# Patient Record
Sex: FEMALE | Race: WHITE | NOT HISPANIC OR LATINO | URBAN - METROPOLITAN AREA
[De-identification: names, ages, dates, MRNs, and addresses within clinical notes are randomized per-mention and may not be internally consistent; named-entity substitution may affect disease eponyms.]

---

## 2017-03-09 ENCOUNTER — APPOINTMENT (RX ONLY)
Dept: URBAN - METROPOLITAN AREA CLINIC 170 | Facility: CLINIC | Age: 82
Setting detail: DERMATOLOGY
End: 2017-03-09

## 2017-03-09 DIAGNOSIS — Z85.828 PERSONAL HISTORY OF OTHER MALIGNANT NEOPLASM OF SKIN: ICD-10-CM

## 2017-03-09 DIAGNOSIS — Z71.89 OTHER SPECIFIED COUNSELING: ICD-10-CM

## 2017-03-09 DIAGNOSIS — L82.1 OTHER SEBORRHEIC KERATOSIS: ICD-10-CM

## 2017-03-09 DIAGNOSIS — D18.0 HEMANGIOMA: ICD-10-CM

## 2017-03-09 DIAGNOSIS — D22 MELANOCYTIC NEVI: ICD-10-CM

## 2017-03-09 PROBLEM — D22.5 MELANOCYTIC NEVI OF TRUNK: Status: ACTIVE | Noted: 2017-03-09

## 2017-03-09 PROBLEM — D18.01 HEMANGIOMA OF SKIN AND SUBCUTANEOUS TISSUE: Status: ACTIVE | Noted: 2017-03-09

## 2017-03-09 PROCEDURE — ? OTHER

## 2017-03-09 PROCEDURE — 99203 OFFICE O/P NEW LOW 30 MIN: CPT

## 2017-03-09 PROCEDURE — ? COUNSELING

## 2017-03-09 PROCEDURE — ? EDUCATIONAL RESOURCES PROVIDED

## 2017-03-09 ASSESSMENT — LOCATION DETAILED DESCRIPTION DERM
LOCATION DETAILED: INFERIOR THORACIC SPINE
LOCATION DETAILED: RIGHT INFERIOR LATERAL LOWER BACK
LOCATION DETAILED: INFERIOR MID FOREHEAD
LOCATION DETAILED: LEFT MEDIAL SUPERIOR CHEST

## 2017-03-09 ASSESSMENT — LOCATION SIMPLE DESCRIPTION DERM
LOCATION SIMPLE: UPPER BACK
LOCATION SIMPLE: CHEST
LOCATION SIMPLE: INFERIOR FOREHEAD
LOCATION SIMPLE: RIGHT LOWER BACK

## 2017-03-09 ASSESSMENT — LOCATION ZONE DERM
LOCATION ZONE: FACE
LOCATION ZONE: TRUNK

## 2017-03-09 NOTE — PROCEDURE: OTHER
Detail Level: Detailed
Note Text (......Xxx Chief Complaint.): This diagnosis correlates with the
Other (Free Text): Lesion on upper chest x 6 weeks\\nEnlarging

## 2017-04-20 ENCOUNTER — FOLLOW UP ESTABLISHED (OUTPATIENT)
Dept: URBAN - METROPOLITAN AREA CLINIC 24 | Facility: CLINIC | Age: 82
End: 2017-04-20
Payer: MEDICARE

## 2017-04-20 DIAGNOSIS — H35.352 CYSTOID MACULAR DEGENERATION, LEFT EYE: Primary | ICD-10-CM

## 2017-04-20 DIAGNOSIS — H35.372 PUCKERING OF MACULA, LEFT EYE: ICD-10-CM

## 2017-04-20 DIAGNOSIS — H26.492 OTHER SECONDARY CATARACT, LEFT EYE: ICD-10-CM

## 2017-04-20 PROCEDURE — 92014 COMPRE OPH EXAM EST PT 1/>: CPT | Performed by: OPHTHALMOLOGY

## 2017-04-20 PROCEDURE — 92134 CPTRZ OPH DX IMG PST SGM RTA: CPT | Performed by: OPHTHALMOLOGY

## 2017-04-20 ASSESSMENT — INTRAOCULAR PRESSURE
OS: 8
OD: 8

## 2017-06-02 ENCOUNTER — FOLLOW UP ESTABLISHED (OUTPATIENT)
Dept: URBAN - METROPOLITAN AREA CLINIC 10 | Facility: CLINIC | Age: 82
End: 2017-06-02
Payer: MEDICARE

## 2017-06-02 DIAGNOSIS — H26.493 OTHER SECONDARY CATARACT, BILATERAL: Primary | ICD-10-CM

## 2017-06-02 PROCEDURE — 92014 COMPRE OPH EXAM EST PT 1/>: CPT | Performed by: OPTOMETRIST

## 2017-06-02 PROCEDURE — 92134 CPTRZ OPH DX IMG PST SGM RTA: CPT | Performed by: OPTOMETRIST

## 2017-06-02 ASSESSMENT — VISUAL ACUITY
OD: 20/20
OS: 20/50

## 2017-06-02 ASSESSMENT — INTRAOCULAR PRESSURE
OS: 10
OD: 10

## 2017-06-05 ENCOUNTER — Encounter (OUTPATIENT)
Dept: URBAN - METROPOLITAN AREA CLINIC 10 | Facility: CLINIC | Age: 82
End: 2017-06-05
Payer: MEDICARE

## 2017-06-05 DIAGNOSIS — Z01.818 ENCOUNTER FOR OTHER PREPROCEDURAL EXAMINATION: Primary | ICD-10-CM

## 2017-06-05 PROCEDURE — 99213 OFFICE O/P EST LOW 20 MIN: CPT | Performed by: PHYSICIAN ASSISTANT

## 2017-06-05 RX ORDER — ISOSORBIDE MONONITRATE 30 MG/1
30 MG TABLET, FILM COATED, EXTENDED RELEASE ORAL
Qty: 0 | Refills: 0 | Status: ACTIVE
Start: 2017-06-05

## 2017-06-05 RX ORDER — METOPROLOL SUCCINATE 25 MG/1
25 MG TABLET, FILM COATED, EXTENDED RELEASE ORAL
Qty: 0 | Refills: 0 | Status: ACTIVE
Start: 2017-06-05

## 2017-06-20 ENCOUNTER — SURGERY (OUTPATIENT)
Dept: URBAN - METROPOLITAN AREA SURGERY 5 | Facility: SURGERY | Age: 82
End: 2017-06-20
Payer: MEDICARE

## 2017-06-20 PROCEDURE — 66821 AFTER CATARACT LASER SURGERY: CPT | Performed by: OPHTHALMOLOGY

## 2020-06-05 ENCOUNTER — APPOINTMENT (RX ONLY)
Dept: URBAN - METROPOLITAN AREA CLINIC 167 | Facility: CLINIC | Age: 85
Setting detail: DERMATOLOGY
End: 2020-06-05

## 2020-06-05 VITALS — TEMPERATURE: 98.2 F

## 2020-06-05 DIAGNOSIS — Z85.828 PERSONAL HISTORY OF OTHER MALIGNANT NEOPLASM OF SKIN: ICD-10-CM

## 2020-06-05 DIAGNOSIS — L82.1 OTHER SEBORRHEIC KERATOSIS: ICD-10-CM

## 2020-06-05 DIAGNOSIS — L57.0 ACTINIC KERATOSIS: ICD-10-CM

## 2020-06-05 PROCEDURE — ? LIQUID NITROGEN

## 2020-06-05 PROCEDURE — 99201: CPT | Mod: 25

## 2020-06-05 PROCEDURE — 17000 DESTRUCT PREMALG LESION: CPT

## 2020-06-05 ASSESSMENT — LOCATION DETAILED DESCRIPTION DERM
LOCATION DETAILED: INFERIOR THORACIC SPINE
LOCATION DETAILED: RIGHT CENTRAL ZYGOMA
LOCATION DETAILED: INFERIOR MID FOREHEAD

## 2020-06-05 ASSESSMENT — LOCATION ZONE DERM
LOCATION ZONE: FACE
LOCATION ZONE: TRUNK

## 2020-06-05 ASSESSMENT — LOCATION SIMPLE DESCRIPTION DERM
LOCATION SIMPLE: INFERIOR FOREHEAD
LOCATION SIMPLE: UPPER BACK
LOCATION SIMPLE: RIGHT ZYGOMA

## 2020-06-05 NOTE — HPI: NON-MELANOMA SKIN CANCER F/U (HISTORY OF NMSC)
How Many Skin Cancers Have You Had?: one
What Is The Reason For Today's Visit?: History of Non-Melanoma Skin Cancer
When Was Your Last Cancer Diagnosed?: 7 years

## 2021-06-28 ENCOUNTER — APPOINTMENT (RX ONLY)
Dept: URBAN - METROPOLITAN AREA CLINIC 167 | Facility: CLINIC | Age: 86
Setting detail: DERMATOLOGY
End: 2021-06-28

## 2021-06-28 DIAGNOSIS — L57.0 ACTINIC KERATOSIS: ICD-10-CM

## 2021-06-28 PROCEDURE — ? COUNSELING

## 2021-06-28 PROCEDURE — 17000 DESTRUCT PREMALG LESION: CPT

## 2021-06-28 PROCEDURE — ? LIQUID NITROGEN

## 2021-06-28 ASSESSMENT — LOCATION ZONE DERM: LOCATION ZONE: FACE

## 2021-06-28 ASSESSMENT — LOCATION DETAILED DESCRIPTION DERM: LOCATION DETAILED: LEFT INFERIOR MEDIAL MALAR CHEEK

## 2021-06-28 ASSESSMENT — LOCATION SIMPLE DESCRIPTION DERM: LOCATION SIMPLE: LEFT CHEEK

## 2022-04-07 ENCOUNTER — APPOINTMENT (OUTPATIENT)
Dept: GENERAL RADIOLOGY | Age: 87
DRG: 522 | End: 2022-04-07
Payer: MEDICARE

## 2022-04-07 ENCOUNTER — HOSPITAL ENCOUNTER (INPATIENT)
Age: 87
LOS: 7 days | Discharge: NURSING, REHAB OR LONG-TERM CARE | DRG: 522 | End: 2022-04-14
Attending: EMERGENCY MEDICINE | Admitting: INTERNAL MEDICINE
Payer: MEDICARE

## 2022-04-07 ENCOUNTER — APPOINTMENT (OUTPATIENT)
Dept: CT IMAGING | Age: 87
DRG: 522 | End: 2022-04-07
Payer: MEDICARE

## 2022-04-07 DIAGNOSIS — S72.002A CLOSED DISPLACED FRACTURE OF LEFT FEMORAL NECK (HCC): Primary | ICD-10-CM

## 2022-04-07 LAB
ANION GAP SERPL CALCULATED.3IONS-SCNC: 13 MMOL/L (ref 3–16)
BACTERIA: ABNORMAL /HPF
BASOPHILS ABSOLUTE: 0 K/UL (ref 0–0.2)
BASOPHILS RELATIVE PERCENT: 0.5 %
BILIRUBIN URINE: NEGATIVE
BLOOD, URINE: NEGATIVE
BUN BLDV-MCNC: 15 MG/DL (ref 7–20)
CALCIUM SERPL-MCNC: 9.4 MG/DL (ref 8.3–10.6)
CHLORIDE BLD-SCNC: 109 MMOL/L (ref 99–110)
CLARITY: CLEAR
CO2: 21 MMOL/L (ref 21–32)
COLOR: YELLOW
CREAT SERPL-MCNC: 0.7 MG/DL (ref 0.6–1.2)
EOSINOPHILS ABSOLUTE: 0.1 K/UL (ref 0–0.6)
EOSINOPHILS RELATIVE PERCENT: 1.5 %
EPITHELIAL CELLS, UA: ABNORMAL /HPF (ref 0–5)
GFR AFRICAN AMERICAN: >60
GFR NON-AFRICAN AMERICAN: >60
GLUCOSE BLD-MCNC: 117 MG/DL (ref 70–99)
GLUCOSE URINE: NEGATIVE MG/DL
HCT VFR BLD CALC: 40.3 % (ref 36–48)
HEMOGLOBIN: 13.6 G/DL (ref 12–16)
INR BLD: 1.04 (ref 0.88–1.12)
KETONES, URINE: 15 MG/DL
LEUKOCYTE ESTERASE, URINE: ABNORMAL
LYMPHOCYTES ABSOLUTE: 2.2 K/UL (ref 1–5.1)
LYMPHOCYTES RELATIVE PERCENT: 29.2 %
MCH RBC QN AUTO: 31.7 PG (ref 26–34)
MCHC RBC AUTO-ENTMCNC: 33.7 G/DL (ref 31–36)
MCV RBC AUTO: 94.2 FL (ref 80–100)
MICROSCOPIC EXAMINATION: YES
MONOCYTES ABSOLUTE: 0.6 K/UL (ref 0–1.3)
MONOCYTES RELATIVE PERCENT: 8.5 %
NEUTROPHILS ABSOLUTE: 4.6 K/UL (ref 1.7–7.7)
NEUTROPHILS RELATIVE PERCENT: 60.3 %
NITRITE, URINE: NEGATIVE
PDW BLD-RTO: 13.4 % (ref 12.4–15.4)
PH UA: 6 (ref 5–8)
PLATELET # BLD: 209 K/UL (ref 135–450)
PMV BLD AUTO: 8.6 FL (ref 5–10.5)
POTASSIUM REFLEX MAGNESIUM: 4.2 MMOL/L (ref 3.5–5.1)
PRO-BNP: 115 PG/ML (ref 0–449)
PROTEIN UA: ABNORMAL MG/DL
PROTHROMBIN TIME: 11.8 SEC (ref 9.9–12.7)
RBC # BLD: 4.28 M/UL (ref 4–5.2)
RBC UA: ABNORMAL /HPF (ref 0–4)
SARS-COV-2, NAAT: NOT DETECTED
SODIUM BLD-SCNC: 143 MMOL/L (ref 136–145)
SPECIFIC GRAVITY UA: >=1.03 (ref 1–1.03)
TROPONIN: <0.01 NG/ML
URINE TYPE: ABNORMAL
UROBILINOGEN, URINE: 0.2 E.U./DL
WBC # BLD: 7.6 K/UL (ref 4–11)
WBC UA: ABNORMAL /HPF (ref 0–5)

## 2022-04-07 PROCEDURE — 85025 COMPLETE CBC W/AUTO DIFF WBC: CPT

## 2022-04-07 PROCEDURE — 72170 X-RAY EXAM OF PELVIS: CPT

## 2022-04-07 PROCEDURE — 6360000002 HC RX W HCPCS: Performed by: STUDENT IN AN ORGANIZED HEALTH CARE EDUCATION/TRAINING PROGRAM

## 2022-04-07 PROCEDURE — 93005 ELECTROCARDIOGRAM TRACING: CPT | Performed by: ORTHOPAEDIC SURGERY

## 2022-04-07 PROCEDURE — 96374 THER/PROPH/DIAG INJ IV PUSH: CPT

## 2022-04-07 PROCEDURE — 6370000000 HC RX 637 (ALT 250 FOR IP): Performed by: STUDENT IN AN ORGANIZED HEALTH CARE EDUCATION/TRAINING PROGRAM

## 2022-04-07 PROCEDURE — 87086 URINE CULTURE/COLONY COUNT: CPT

## 2022-04-07 PROCEDURE — 99284 EMERGENCY DEPT VISIT MOD MDM: CPT

## 2022-04-07 PROCEDURE — 6360000002 HC RX W HCPCS: Performed by: INTERNAL MEDICINE

## 2022-04-07 PROCEDURE — 73552 X-RAY EXAM OF FEMUR 2/>: CPT

## 2022-04-07 PROCEDURE — 87635 SARS-COV-2 COVID-19 AMP PRB: CPT

## 2022-04-07 PROCEDURE — 83880 ASSAY OF NATRIURETIC PEPTIDE: CPT

## 2022-04-07 PROCEDURE — 36415 COLL VENOUS BLD VENIPUNCTURE: CPT

## 2022-04-07 PROCEDURE — 2580000003 HC RX 258: Performed by: INTERNAL MEDICINE

## 2022-04-07 PROCEDURE — 81001 URINALYSIS AUTO W/SCOPE: CPT

## 2022-04-07 PROCEDURE — 71045 X-RAY EXAM CHEST 1 VIEW: CPT

## 2022-04-07 PROCEDURE — 70450 CT HEAD/BRAIN W/O DYE: CPT

## 2022-04-07 PROCEDURE — 6370000000 HC RX 637 (ALT 250 FOR IP): Performed by: INTERNAL MEDICINE

## 2022-04-07 PROCEDURE — 84484 ASSAY OF TROPONIN QUANT: CPT

## 2022-04-07 PROCEDURE — 85610 PROTHROMBIN TIME: CPT

## 2022-04-07 PROCEDURE — 80048 BASIC METABOLIC PNL TOTAL CA: CPT

## 2022-04-07 PROCEDURE — 1200000000 HC SEMI PRIVATE

## 2022-04-07 RX ORDER — SODIUM CHLORIDE 0.9 % (FLUSH) 0.9 %
10 SYRINGE (ML) INJECTION EVERY 12 HOURS SCHEDULED
Status: DISCONTINUED | OUTPATIENT
Start: 2022-04-07 | End: 2022-04-14 | Stop reason: HOSPADM

## 2022-04-07 RX ORDER — ISOSORBIDE MONONITRATE 30 MG/1
30 TABLET, EXTENDED RELEASE ORAL DAILY
Status: DISCONTINUED | OUTPATIENT
Start: 2022-04-08 | End: 2022-04-14 | Stop reason: HOSPADM

## 2022-04-07 RX ORDER — LORATADINE 10 MG/1
10 CAPSULE, LIQUID FILLED ORAL DAILY
COMMUNITY

## 2022-04-07 RX ORDER — ACETAMINOPHEN 325 MG/1
650 TABLET ORAL EVERY 6 HOURS PRN
Status: DISCONTINUED | OUTPATIENT
Start: 2022-04-07 | End: 2022-04-08

## 2022-04-07 RX ORDER — ISOSORBIDE MONONITRATE 30 MG/1
30 TABLET, EXTENDED RELEASE ORAL DAILY
COMMUNITY

## 2022-04-07 RX ORDER — SODIUM CHLORIDE 9 MG/ML
INJECTION, SOLUTION INTRAVENOUS PRN
Status: DISCONTINUED | OUTPATIENT
Start: 2022-04-07 | End: 2022-04-14 | Stop reason: HOSPADM

## 2022-04-07 RX ORDER — MORPHINE SULFATE 4 MG/ML
4 INJECTION, SOLUTION INTRAMUSCULAR; INTRAVENOUS
Status: DISCONTINUED | OUTPATIENT
Start: 2022-04-07 | End: 2022-04-07

## 2022-04-07 RX ORDER — FENTANYL CITRATE 50 UG/ML
25 INJECTION, SOLUTION INTRAMUSCULAR; INTRAVENOUS ONCE
Status: COMPLETED | OUTPATIENT
Start: 2022-04-07 | End: 2022-04-07

## 2022-04-07 RX ORDER — METOPROLOL SUCCINATE 25 MG/1
25 TABLET, EXTENDED RELEASE ORAL DAILY
Status: DISCONTINUED | OUTPATIENT
Start: 2022-04-08 | End: 2022-04-14 | Stop reason: HOSPADM

## 2022-04-07 RX ORDER — ASPIRIN 81 MG/1
81 TABLET, CHEWABLE ORAL DAILY
COMMUNITY

## 2022-04-07 RX ORDER — SODIUM CHLORIDE 0.9 % (FLUSH) 0.9 %
10 SYRINGE (ML) INJECTION PRN
Status: DISCONTINUED | OUTPATIENT
Start: 2022-04-07 | End: 2022-04-14 | Stop reason: HOSPADM

## 2022-04-07 RX ORDER — FLUTICASONE PROPIONATE 50 MCG
1 SPRAY, SUSPENSION (ML) NASAL DAILY
COMMUNITY

## 2022-04-07 RX ORDER — METOPROLOL SUCCINATE 25 MG/1
25 TABLET, EXTENDED RELEASE ORAL DAILY
COMMUNITY

## 2022-04-07 RX ORDER — FLUTICASONE PROPIONATE 50 MCG
1 SPRAY, SUSPENSION (ML) NASAL DAILY
Status: DISCONTINUED | OUTPATIENT
Start: 2022-04-08 | End: 2022-04-14 | Stop reason: HOSPADM

## 2022-04-07 RX ORDER — CETIRIZINE HYDROCHLORIDE 10 MG/1
10 TABLET ORAL DAILY
Status: DISCONTINUED | OUTPATIENT
Start: 2022-04-08 | End: 2022-04-14 | Stop reason: HOSPADM

## 2022-04-07 RX ORDER — ONDANSETRON 2 MG/ML
4 INJECTION INTRAMUSCULAR; INTRAVENOUS EVERY 6 HOURS PRN
Status: DISCONTINUED | OUTPATIENT
Start: 2022-04-07 | End: 2022-04-14 | Stop reason: HOSPADM

## 2022-04-07 RX ORDER — MORPHINE SULFATE 2 MG/ML
2 INJECTION, SOLUTION INTRAMUSCULAR; INTRAVENOUS
Status: DISCONTINUED | OUTPATIENT
Start: 2022-04-07 | End: 2022-04-07

## 2022-04-07 RX ORDER — ATORVASTATIN CALCIUM 20 MG/1
20 TABLET, FILM COATED ORAL DAILY
Status: DISCONTINUED | OUTPATIENT
Start: 2022-04-08 | End: 2022-04-14 | Stop reason: HOSPADM

## 2022-04-07 RX ORDER — SENNA AND DOCUSATE SODIUM 50; 8.6 MG/1; MG/1
1 TABLET, FILM COATED ORAL 2 TIMES DAILY
Status: DISCONTINUED | OUTPATIENT
Start: 2022-04-07 | End: 2022-04-14 | Stop reason: HOSPADM

## 2022-04-07 RX ORDER — PROMETHAZINE HYDROCHLORIDE 25 MG/1
12.5 TABLET ORAL EVERY 6 HOURS PRN
Status: DISCONTINUED | OUTPATIENT
Start: 2022-04-07 | End: 2022-04-14 | Stop reason: HOSPADM

## 2022-04-07 RX ORDER — ACETAMINOPHEN 325 MG/1
975 TABLET ORAL ONCE
Status: COMPLETED | OUTPATIENT
Start: 2022-04-07 | End: 2022-04-07

## 2022-04-07 RX ORDER — ACETAMINOPHEN 650 MG/1
650 SUPPOSITORY RECTAL EVERY 6 HOURS PRN
Status: DISCONTINUED | OUTPATIENT
Start: 2022-04-07 | End: 2022-04-08

## 2022-04-07 RX ORDER — POLYETHYLENE GLYCOL 3350 17 G/17G
17 POWDER, FOR SOLUTION ORAL DAILY PRN
Status: DISCONTINUED | OUTPATIENT
Start: 2022-04-07 | End: 2022-04-14 | Stop reason: HOSPADM

## 2022-04-07 RX ORDER — ATORVASTATIN CALCIUM 20 MG/1
20 TABLET, FILM COATED ORAL DAILY
COMMUNITY

## 2022-04-07 RX ADMIN — SODIUM CHLORIDE, PRESERVATIVE FREE 10 ML: 5 INJECTION INTRAVENOUS at 20:40

## 2022-04-07 RX ADMIN — HYDROMORPHONE HYDROCHLORIDE 0.5 MG: 1 INJECTION, SOLUTION INTRAMUSCULAR; INTRAVENOUS; SUBCUTANEOUS at 20:34

## 2022-04-07 RX ADMIN — ACETAMINOPHEN 975 MG: 325 TABLET ORAL at 15:25

## 2022-04-07 RX ADMIN — ONDANSETRON 4 MG: 2 INJECTION INTRAMUSCULAR; INTRAVENOUS at 22:02

## 2022-04-07 RX ADMIN — MORPHINE SULFATE 4 MG: 4 INJECTION INTRAVENOUS at 17:42

## 2022-04-07 RX ADMIN — SENNOSIDES AND DOCUSATE SODIUM 1 TABLET: 50; 8.6 TABLET ORAL at 20:34

## 2022-04-07 RX ADMIN — FENTANYL CITRATE 25 MCG: 50 INJECTION, SOLUTION INTRAMUSCULAR; INTRAVENOUS at 16:53

## 2022-04-07 ASSESSMENT — PAIN DESCRIPTION - DESCRIPTORS: DESCRIPTORS: SHARP;STABBING

## 2022-04-07 ASSESSMENT — PAIN SCALES - GENERAL
PAINLEVEL_OUTOF10: 10
PAINLEVEL_OUTOF10: 8
PAINLEVEL_OUTOF10: 10

## 2022-04-07 ASSESSMENT — PAIN DESCRIPTION - FREQUENCY: FREQUENCY: CONTINUOUS

## 2022-04-07 ASSESSMENT — ENCOUNTER SYMPTOMS
FACIAL SWELLING: 0
SORE THROAT: 0
COUGH: 0
SHORTNESS OF BREATH: 0
EYE REDNESS: 0
RHINORRHEA: 0
NAUSEA: 0
EYE PAIN: 0
DIARRHEA: 0
ABDOMINAL PAIN: 0
ABDOMINAL DISTENTION: 0
PHOTOPHOBIA: 0
VOMITING: 0
CHEST TIGHTNESS: 0

## 2022-04-07 ASSESSMENT — PAIN DESCRIPTION - PAIN TYPE: TYPE: ACUTE PAIN

## 2022-04-07 ASSESSMENT — PAIN DESCRIPTION - LOCATION: LOCATION: HIP

## 2022-04-07 NOTE — PLAN OF CARE
Ortho Plan of Care    I reviewed the patient's radiographs demonstrating a left displaced femoral neck fracture  Patient is OCTOR in am for left hip hemiarthroplasty  Bedrest  NPO after midnight  Pain control  DVT prophylaxis  Medical optimization  Full consult to follow

## 2022-04-07 NOTE — H&P
Hospital Medicine History & Physical      PCP: PROVIDER Eitan Castro MD    Date of Admission: 4/7/2022    Date of Service: 4/7/2022    Pt seen/examined on 4/7/2022    Admitted to Inpatient with expected LOS greater than two midnights due to medical therapy. Chief Complaint:     Chief Complaint   Patient presents with    Hip Pain     Pt fell from standing, witnessed fall, no LOC (LEFT HIP)       History Of Present Illness:      81 y.o. female who presented to [facility] with pain in the left hip that began today when she had a mechanical fall to her left hip and has persisted since that time. There was no loss of consciousness. She has not hit her head. She has been otherwise feeling well. Found to have a left femoral neck fracture. Admitted for further management. Past Medical History:      Reviewed and non-contributory except as noted below  History reviewed. No pertinent past medical history. Past Surgical History:      Reviewed and non-contributory except as noted below  History reviewed. No pertinent surgical history. Medications Prior to Admission:      Reviewed and non-contributory except as noted below  Prior to Admission medications    Medication Sig Start Date End Date Taking?  Authorizing Provider   aspirin 81 MG chewable tablet Take 81 mg by mouth daily   Yes Historical Provider, MD   atorvastatin (LIPITOR) 20 MG tablet Take 20 mg by mouth daily   Yes Historical Provider, MD   metoprolol succinate (TOPROL XL) 25 MG extended release tablet Take 25 mg by mouth daily 1/2 tab daily   Yes Historical Provider, MD   loratadine (CLARITIN) 10 MG capsule Take 10 mg by mouth daily   Yes Historical Provider, MD   fluticasone (FLONASE) 50 MCG/ACT nasal spray 1 spray by Each Nostril route daily   Yes Historical Provider, MD   isosorbide mononitrate (IMDUR) 30 MG extended release tablet Take 30 mg by mouth daily   Yes Historical Provider, MD       Allergies:     Reviewed and non-contributory except as noted below   Patient has no known allergies. Social History:      Reviewed and non-contributory except as noted below    TOBACCO:   reports that she has never smoked. She has never used smokeless tobacco.  ETOH:   reports no history of alcohol use. Family History:      Reviewed and non-contributory except as noted below    History reviewed. No pertinent family history. REVIEW OF SYSTEMS:   Pertinent positives and negatives as noted in the HPI. All other systems reviewed and negative. PHYSICAL EXAM PERFORMED:    /64   Pulse 67   Temp 97.7 °F (36.5 °C) (Oral)   Resp 16   Ht 5' 2\" (1.575 m)   Wt 128 lb 12.8 oz (58.4 kg)   SpO2 97%   BMI 23.56 kg/m²     General appearance:  No acute distress, appears stated age  Eyes: Pupils equal, round, reactive to light, conjunctiva/corneas clear  Ears/Nose/Mouth/Throat: No external lesions or scars, hearing intact to voice  Neck: Trachea midline, no masses noted, no thyromegaly  Respiratory:  Non-labored breathing, clear to auscultation bilaterally  Cardiovascular: Regular rate and rhythm, no murmurs, gallops, or rubs  Abdomen: soft, non-tender, non-distended  Musculoskeletal: Left lower extremity neurovascularly intact  Skin: normal color, no wounds noted  Psychiatric: A&Ox4, good insight and judgment    Labs:     Recent Labs     04/07/22  1535   WBC 7.6   HGB 13.6   HCT 40.3        Recent Labs     04/07/22  1535      K 4.2      CO2 21   BUN 15   CREATININE 0.7   CALCIUM 9.4     No results for input(s): AST, ALT, BILIDIR, BILITOT, ALKPHOS in the last 72 hours.   Recent Labs     04/07/22  1534   INR 1.04     Recent Labs     04/07/22  1535   TROPONINI <0.01       Urinalysis:      Lab Results   Component Value Date    NITRU Negative 04/07/2022    WBCUA 0-2 04/07/2022    BACTERIA 2+ 04/07/2022    RBCUA None seen 04/07/2022    BLOODU Negative 04/07/2022    SPECGRAV >=1.030 04/07/2022    GLUCOSEU Negative 04/07/2022       Radiology: XR CHEST PORTABLE   Final Result   1. No evidence of acute cardiopulmonary disease. XR PELVIS (1-2 VIEWS)   Final Result      Acute left femoral neck fracture. LEFT FEMUR 2 VIEWS      INDICATION: Fall. Fracture. FINDINGS: AP and lateral views left femur were obtained. Note is again made of the left femoral neck fracture. No other fractures are identified. No dislocation is identified. IMPRESSION:   1. Acute left femoral neck fracture. XR FEMUR LEFT (MIN 2 VIEWS)   Final Result      Acute left femoral neck fracture. LEFT FEMUR 2 VIEWS      INDICATION: Fall. Fracture. FINDINGS: AP and lateral views left femur were obtained. Note is again made of the left femoral neck fracture. No other fractures are identified. No dislocation is identified. IMPRESSION:   1. Acute left femoral neck fracture. CT Head WO Contrast   Final Result   1. Significant central and cortical atrophic changes   2. 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 No evidence of acute intracranial hemorrhage          ASSESSMENT:    Active Hospital Problems    Diagnosis Date Noted    Closed displaced fracture of left femoral neck ([facility] [address] [S72.002A] 04/07/2022       PLAN:    #Traumatic left femoral neck fracture  Further work-up required  Orthopedic surgery consulted  Analgesia as needed including IV morphine  N.p.o. at midnight for possible intervention  We will need PT/OT, CM postop  Hold AP/AC possible procedure    #Full medication list  Hold ASA for possible surgery  Continue remainder of home medications    DVT Prophylaxis: SCDs  Diet: ADULT DIET; Regular  Diet NPO Exceptions are: Sips of Water with Meds  Code Status: Full Code    PT/OT Eval Status: Deferred    Dispo: Lucie Khan pending clinical improvement    Milton Douglass MD    Thank you PROVIDER UNKNOWN, MD for the opportunity to be involved in this patient's care.  If you have any questions or concerns please feel free to contact me at [phone].

## 2022-04-07 NOTE — ED PROVIDER NOTES
[address] [facility] RESIDENT NOTE       Date of evaluation: 4/7/2022    Chief Complaint     Hip Pain (Pt fell from standing, witnessed fall, no LOC (LEFT HIP))      History of Present Illness     Ivan Jason is a 81 y.o. female with a PMH of dementia, CAD, who presents following a fall. According to EMS, patient sustained a witnessed fall and presents following a mechanical fall. Apparently did not hit her head or lose consciousness. Patient is not anticoagulated. Upon arrival to our emergency department, patient complains of left-sided hip pain. Denies any numbness or tingling of her leg. Does not recall the circumstances of the fall. Denies any chest pain or shortness of breath. Discussed with patient's , who confirms that patient fell while standing with her walker. Did not lose consciousness or hit her head. Appears to have directly landed onto her left hip. Has been complaining of left hip pain since the fall. Review of Systems     Review of Systems   Constitutional: Negative for activity change, appetite change, chills and fever. HENT: Negative for congestion, ear pain, facial swelling, rhinorrhea and sore throat. Eyes: Negative for photophobia, pain and redness. Respiratory: Negative for cough, chest tightness and shortness of breath. Cardiovascular: Negative for chest pain and leg swelling. Gastrointestinal: Negative for abdominal distention, abdominal pain, diarrhea, nausea and vomiting. Endocrine: Negative for polydipsia, polyphagia and polyuria. Genitourinary: Negative for difficulty urinating, dysuria and flank pain. Musculoskeletal: Positive for joint swelling. Negative for myalgias, neck pain and neck stiffness. Skin: Negative for pallor, rash and wound. Allergic/Immunologic: Negative for food allergies and immunocompromised state. Neurological: Negative for seizures, syncope and headaches.        Past Medical, Surgical, Family, and Social History     She has no past medical history on file. She has no past surgical history on file. Her family history is not on file. She reports that she has never smoked. She has never used smokeless tobacco. She reports that she does not drink alcohol and does not use drugs. Medications     Current Discharge Medication List      CONTINUE these medications which have NOT CHANGED    Details   aspirin 81 MG chewable tablet Take 81 mg by mouth daily      atorvastatin (LIPITOR) 20 MG tablet Take 20 mg by mouth daily      metoprolol succinate (TOPROL XL) 25 MG extended release tablet Take 25 mg by mouth daily 1/2 tab daily      loratadine (CLARITIN) 10 MG capsule Take 10 mg by mouth daily      fluticasone (FLONASE) 50 MCG/ACT nasal spray 1 spray by Each Nostril route daily      isosorbide mononitrate (IMDUR) 30 MG extended release tablet Take 30 mg by mouth daily             Allergies     She has No Known Allergies. Physical Exam     INITIAL VITALS: BP: (!) 162/97, Temp: 97.7 °F (36.5 °C), Pulse: 88, Resp: 16, SpO2: 100 %   Physical Exam  HENT:      Head: Normocephalic. Right Ear: Tympanic membrane normal.      Left Ear: Tympanic membrane normal.      Nose: Nose normal.      Mouth/Throat:      Pharynx: Oropharynx is clear. Eyes:      Extraocular Movements: Extraocular movements intact. Pupils: Pupils are equal, round, and reactive to light. Cardiovascular:      Rate and Rhythm: Normal rate and regular rhythm. Pulses: Normal pulses. Pulmonary:      Effort: Pulmonary effort is normal. No respiratory distress. Breath sounds: No wheezing. Abdominal:      General: There is no distension. Palpations: Abdomen is soft. Tenderness: There is no abdominal tenderness. Musculoskeletal:         General: Tenderness and signs of injury present. Cervical back: Normal range of motion. Comments: Tenderness to palpation over left hip with logroll. Left hip held in externally rotated position and knee is flexed. Tenderness to palpation over knee and or shin, yet there is tenderness over the mid femur. No obvious issues with angulation. No overlying hematoma. Palpable still dorsalis pedis pulse. Sensation over distal foot. Skin:     General: Skin is warm. Capillary Refill: Capillary refill takes less than 2 seconds. Neurological:      General: No focal deficit present. Mental Status: She is alert. Mental status is at baseline. Comments: Oriented x2, which appears to be patient's baseline, not orientated to time. DiagnosticResults     EKG   Interpreted in conjunction with emergencydepartment physician Sergey Zambrano MD  Rhythm: normal sinus   Rate: normal  Axis: normal  Ectopy: premature ventricular contractions (unifocal)  Conduction: non=specific block  ST Segments: no acute change  T Waves:no acute change  Q Waves: nonspecific  Clinical Impression: overall, poor quality EKG yet no ischemic changes    RADIOLOGY:  XR CHEST PORTABLE   Final Result   1. No evidence of acute cardiopulmonary disease. XR PELVIS (1-2 VIEWS)   Final Result      Acute left femoral neck fracture. LEFT FEMUR 2 VIEWS      INDICATION: Fall. Fracture. FINDINGS: AP and lateral views left femur were obtained. Note is again made of the left femoral neck fracture. No other fractures are identified. No dislocation is identified. IMPRESSION:   1. Acute left femoral neck fracture. XR FEMUR LEFT (MIN 2 VIEWS)   Final Result      Acute left femoral neck fracture. LEFT FEMUR 2 VIEWS      INDICATION: Fall. Fracture. FINDINGS: AP and lateral views left femur were obtained. Note is again made of the left femoral neck fracture. No other fractures are identified. No dislocation is identified. IMPRESSION:   1. Acute left femoral neck fracture. CT Head WO Contrast   Final Result   1.  Significant central and cortical atrophic changes   2. 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  No evidence of acute intracranial hemorrhage          LABS:   Results for orders placed or performed during the hospital encounter of 04/07/22   COVID-19, Rapid    Specimen: Nasopharyngeal Swab   Result Value Ref Range    SARS-CoV-2, NAAT Not Detected Not Detected   CBC with Auto Differential   Result Value Ref Range    WBC 7.6 4.0 - 11.0 K/uL    RBC 4.28 4.00 - 5.20 M/uL    Hemoglobin 13.6 12.0 - 16.0 g/dL    Hematocrit 40.3 36.0 - 48.0 %    MCV 94.2 80.0 - 100.0 fL    MCH 31.7 26.0 - 34.0 pg    MCHC 33.7 31.0 - 36.0 g/dL    RDW 13.4 12.4 - 15.4 %    Platelets 807 476 - 312 K/uL    MPV 8.6 5.0 - 10.5 fL    Neutrophils % 60.3 %    Lymphocytes % 29.2 %    Monocytes % 8.5 %    Eosinophils % 1.5 %    Basophils % 0.5 %    Neutrophils Absolute 4.6 1.7 - 7.7 K/uL    Lymphocytes Absolute 2.2 1.0 - 5.1 K/uL    Monocytes Absolute 0.6 0.0 - 1.3 K/uL    Eosinophils Absolute 0.1 0.0 - 0.6 K/uL    Basophils Absolute 0.0 0.0 - 0.2 K/uL   Basic Metabolic Panel w/ Reflex to MG   Result Value Ref Range    Sodium 143 136 - 145 mmol/L    Potassium reflex Magnesium 4.2 3.5 - 5.1 mmol/L    Chloride 109 99 - 110 mmol/L    CO2 21 21 - 32 mmol/L    Anion Gap 13 3 - 16    Glucose 117 (H) 70 - 99 mg/dL    BUN 15 7 - 20 mg/dL    CREATININE 0.7 0.6 - 1.2 mg/dL    GFR Non-African American >60 >60    GFR African American >60 >60    Calcium 9.4 8.3 - 10.6 mg/dL   Troponin   Result Value Ref Range    Troponin <0.01 <0.01 ng/mL   Brain Natriuretic Peptide   Result Value Ref Range    Pro- 0 - 449 pg/mL   Urinalysis with Microscopic   Result Value Ref Range    Color, UA Yellow Straw/Yellow    Clarity, UA Clear Clear    Glucose, Ur Negative Negative mg/dL    Bilirubin Urine Negative Negative    Ketones, Urine 15 (A) Negative mg/dL    Specific Gravity, UA >=1.030 1.005 - 1.030    Blood, Urine Negative Negative    pH, UA 6.0 5.0 - 8.0    Protein, UA TRACE (A) Negative mg/dL    Urobilinogen, Urine 0.2 <2.0 E.U./dL    Nitrite, Urine Negative Negative    Leukocyte Esterase, Urine TRACE (A) Negative    Microscopic Examination YES     Urine Type Catheter     WBC, UA 0-2 0 - 5 /HPF    RBC, UA None seen 0 - 4 /HPF    Epithelial Cells, UA 11-20 (A) 0 - 5 /HPF    Bacteria, UA 2+ (A) None Seen /HPF   Protime-INR   Result Value Ref Range    Protime 11.8 9.9 - 12.7 sec    INR 1.04 0.88 - 1.12       ED BEDSIDE ULTRASOUND:  None    RECENT VITALS:  BP: (!) 155/78, Temp: 97.7 °F (36.5 °C), Pulse: 74,Resp: 17, SpO2: 94 %     Procedures     None     ED Course     Nursing Notes, Past Medical Hx, Past Surgical Hx, Social Hx, Allergies, and Family Hx were reviewed.     The patient was given the followingmedications:  Orders Placed This Encounter   Medications    acetaminophen (TYLENOL) tablet 975 mg    fentaNYL (SUBLIMAZE) injection 25 mcg    atorvastatin (LIPITOR) tablet 20 mg    fluticasone (FLONASE) 50 MCG/ACT nasal spray 1 spray    isosorbide mononitrate (IMDUR) extended release tablet 30 mg    metoprolol succinate (TOPROL XL) extended release tablet 25 mg    cetirizine (ZYRTEC) tablet 10 mg    DISCONTD: morphine (PF) injection 2 mg    DISCONTD: morphine injection 4 mg    sodium chloride flush 0.9 % injection 10 mL    sodium chloride flush 0.9 % injection 10 mL    0.9 % sodium chloride infusion    OR Linked Order Group     promethazine (PHENERGAN) tablet 12.5 mg     ondansetron (ZOFRAN) injection 4 mg    sennosides-docusate sodium (SENOKOT-S) 8.6-50 MG tablet 1 tablet    polyethylene glycol (GLYCOLAX) packet 17 g    OR Linked Order Group     acetaminophen (TYLENOL) tablet 650 mg     acetaminophen (TYLENOL) suppository 650 mg    DISCONTD: HYDROmorphone (DILAUDID) injection 0.25 mg    DISCONTD: HYDROmorphone (DILAUDID) injection 0.5 mg    OR Linked Order Group     HYDROmorphone (DILAUDID) injection 0.25 mg     HYDROmorphone (DILAUDID) injection 0.5 mg       CONSULTS:  [address] TO HOSPITALIST  IP CONSULT TO CASE MANAGEMENT    MEDICAL DECISION MAKING / ASSESSMENT / PLAN   Upon presentation, patient was found to be alert, comfortable, with unremarkable vitals signs. Given that patient presents to the ED following a ground-level fall onto her left hip, and underlying factor was considered the most likely cause of patient's pain. This was further supported by her holding her hip in a externally rotated position pain worsened with logroll. Otherwise, primary survey was intact, no other injuries found on secondary survey with a GCS of 15. Overall, patient appears to have suffered a mechanical fall. Syncopal episode was considered, edema less likely, as patient's  denies any loss of consciousness. Furthermore, patient does not complain of any chest pain, shortness of breath, and or other significant symptoms on presentation to the ED. CBC without leukocytosis. Hemoglobin hematocrit appear stable. Metabolic panel without significant electrolyte abnormalities. Troponin was undetectable. EKG without ischemic changes. X-ray of the left hip with a left femoral neck fracture. Femur x-rays were unremarkable. X-ray without underlying rib fractures. CT head without signs of an active bleed. Patient was given Tylenol and fentanyl for pain control. Case was discussed with orthopedic surgery, that recommended likely surgical intervention in the next 24 to 48 hours. In the meantime decision was made to admit patient to the medicine service for pain control and monitoring. This patient was also evaluated by the attending physician. All care plans werediscussed and agreed upon. Clinical Impression     1. Closed displaced fracture of left femoral neck (HCC)        Disposition     PATIENT REFERRED TO:  No follow-up provider specified.     DISCHARGE MEDICATIONS:  Current Discharge Medication List

## 2022-04-07 NOTE — ED PROVIDER NOTES
ED Attending Attestation Note     Date of evaluation: 4/7/2022    This patient was seen by the resident. I have seen and examined the patient, agree with the workup, evaluation, management and diagnosis. The care plan has been discussed. Patient presents to the emerge department after a witnessed mechanical fall earlier today during which she landed on her left hip. She is not on any anticoagulation. She denies strike her head or lose consciousness. She reports severe, 8/10 severity pain in the left hip. On examination find a pleasant adult female, speaking in complete sentences. No increased work of breathing or accessory muscle use during respiration. The patient has a internally rotated and foreshortened left hip concerning for femoral fracture. She is neurovascular intact to the extremity including strong DP and PT pulses. We will plan for x-ray imaging and likely admission versus transfer based on orthopedic recommendations. Ray Bishop MD MPH   Physician       Ana Cardenas MD  04/07/22 1998

## 2022-04-08 ENCOUNTER — ANESTHESIA (OUTPATIENT)
Dept: OPERATING ROOM | Age: 87
DRG: 522 | End: 2022-04-08
Payer: MEDICARE

## 2022-04-08 ENCOUNTER — ANESTHESIA EVENT (OUTPATIENT)
Dept: OPERATING ROOM | Age: 87
DRG: 522 | End: 2022-04-08
Payer: MEDICARE

## 2022-04-08 ENCOUNTER — APPOINTMENT (OUTPATIENT)
Dept: GENERAL RADIOLOGY | Age: 87
DRG: 522 | End: 2022-04-08
Payer: MEDICARE

## 2022-04-08 VITALS — OXYGEN SATURATION: 100 % | TEMPERATURE: 97.2 F | SYSTOLIC BLOOD PRESSURE: 199 MMHG | DIASTOLIC BLOOD PRESSURE: 83 MMHG

## 2022-04-08 LAB
ANION GAP SERPL CALCULATED.3IONS-SCNC: 12 MMOL/L (ref 3–16)
BASOPHILS ABSOLUTE: 0 K/UL (ref 0–0.2)
BASOPHILS RELATIVE PERCENT: 0.2 %
BUN BLDV-MCNC: 20 MG/DL (ref 7–20)
CALCIUM SERPL-MCNC: 9.5 MG/DL (ref 8.3–10.6)
CHLORIDE BLD-SCNC: 107 MMOL/L (ref 99–110)
CO2: 20 MMOL/L (ref 21–32)
CREAT SERPL-MCNC: 0.6 MG/DL (ref 0.6–1.2)
EOSINOPHILS ABSOLUTE: 0.1 K/UL (ref 0–0.6)
EOSINOPHILS RELATIVE PERCENT: 0.5 %
GFR AFRICAN AMERICAN: >60
GFR NON-AFRICAN AMERICAN: >60
GLUCOSE BLD-MCNC: 121 MG/DL (ref 70–99)
HCT VFR BLD CALC: 38.8 % (ref 36–48)
HEMOGLOBIN: 13.2 G/DL (ref 12–16)
LYMPHOCYTES ABSOLUTE: 1.5 K/UL (ref 1–5.1)
LYMPHOCYTES RELATIVE PERCENT: 14.3 %
MCH RBC QN AUTO: 32.2 PG (ref 26–34)
MCHC RBC AUTO-ENTMCNC: 33.9 G/DL (ref 31–36)
MCV RBC AUTO: 94.8 FL (ref 80–100)
MONOCYTES ABSOLUTE: 1.1 K/UL (ref 0–1.3)
MONOCYTES RELATIVE PERCENT: 10 %
NEUTROPHILS ABSOLUTE: 8 K/UL (ref 1.7–7.7)
NEUTROPHILS RELATIVE PERCENT: 75 %
PDW BLD-RTO: 13.1 % (ref 12.4–15.4)
PLATELET # BLD: 201 K/UL (ref 135–450)
PMV BLD AUTO: 8.7 FL (ref 5–10.5)
POTASSIUM REFLEX MAGNESIUM: 4.4 MMOL/L (ref 3.5–5.1)
RBC # BLD: 4.09 M/UL (ref 4–5.2)
SODIUM BLD-SCNC: 139 MMOL/L (ref 136–145)
URINE CULTURE, ROUTINE: NORMAL
WBC # BLD: 10.6 K/UL (ref 4–11)

## 2022-04-08 PROCEDURE — 2500000003 HC RX 250 WO HCPCS: Performed by: NURSE ANESTHETIST, CERTIFIED REGISTERED

## 2022-04-08 PROCEDURE — 73502 X-RAY EXAM HIP UNI 2-3 VIEWS: CPT

## 2022-04-08 PROCEDURE — 6360000002 HC RX W HCPCS: Performed by: INTERNAL MEDICINE

## 2022-04-08 PROCEDURE — 6360000002 HC RX W HCPCS: Performed by: ORTHOPAEDIC SURGERY

## 2022-04-08 PROCEDURE — 2580000003 HC RX 258: Performed by: ORTHOPAEDIC SURGERY

## 2022-04-08 PROCEDURE — 7100000000 HC PACU RECOVERY - FIRST 15 MIN: Performed by: ORTHOPAEDIC SURGERY

## 2022-04-08 PROCEDURE — 2580000003 HC RX 258: Performed by: INTERNAL MEDICINE

## 2022-04-08 PROCEDURE — 1200000000 HC SEMI PRIVATE

## 2022-04-08 PROCEDURE — 36415 COLL VENOUS BLD VENIPUNCTURE: CPT

## 2022-04-08 PROCEDURE — 6360000002 HC RX W HCPCS: Performed by: ANESTHESIOLOGY

## 2022-04-08 PROCEDURE — 3600000015 HC SURGERY LEVEL 5 ADDTL 15MIN: Performed by: ORTHOPAEDIC SURGERY

## 2022-04-08 PROCEDURE — 3700000000 HC ANESTHESIA ATTENDED CARE: Performed by: ORTHOPAEDIC SURGERY

## 2022-04-08 PROCEDURE — 6370000000 HC RX 637 (ALT 250 FOR IP): Performed by: ORTHOPAEDIC SURGERY

## 2022-04-08 PROCEDURE — 0SRS0JZ REPLACEMENT OF LEFT HIP JOINT, FEMORAL SURFACE WITH SYNTHETIC SUBSTITUTE, OPEN APPROACH: ICD-10-PCS | Performed by: ORTHOPAEDIC SURGERY

## 2022-04-08 PROCEDURE — 3600000005 HC SURGERY LEVEL 5 BASE: Performed by: ORTHOPAEDIC SURGERY

## 2022-04-08 PROCEDURE — 7100000001 HC PACU RECOVERY - ADDTL 15 MIN: Performed by: ORTHOPAEDIC SURGERY

## 2022-04-08 PROCEDURE — 3700000001 HC ADD 15 MINUTES (ANESTHESIA): Performed by: ORTHOPAEDIC SURGERY

## 2022-04-08 PROCEDURE — 85025 COMPLETE CBC W/AUTO DIFF WBC: CPT

## 2022-04-08 PROCEDURE — 2500000003 HC RX 250 WO HCPCS: Performed by: ORTHOPAEDIC SURGERY

## 2022-04-08 PROCEDURE — 2709999900 HC NON-CHARGEABLE SUPPLY: Performed by: ORTHOPAEDIC SURGERY

## 2022-04-08 PROCEDURE — 80048 BASIC METABOLIC PNL TOTAL CA: CPT

## 2022-04-08 PROCEDURE — C1776 JOINT DEVICE (IMPLANTABLE): HCPCS | Performed by: ORTHOPAEDIC SURGERY

## 2022-04-08 PROCEDURE — 99222 1ST HOSP IP/OBS MODERATE 55: CPT | Performed by: ORTHOPAEDIC SURGERY

## 2022-04-08 PROCEDURE — 6360000002 HC RX W HCPCS: Performed by: NURSE ANESTHETIST, CERTIFIED REGISTERED

## 2022-04-08 PROCEDURE — 94640 AIRWAY INHALATION TREATMENT: CPT

## 2022-04-08 PROCEDURE — 2580000003 HC RX 258: Performed by: ANESTHESIOLOGY

## 2022-04-08 PROCEDURE — 6370000000 HC RX 637 (ALT 250 FOR IP): Performed by: INTERNAL MEDICINE

## 2022-04-08 DEVICE — AVENIR COMPLETE STANDARD COLLARED SIZE 3: Type: IMPLANTABLE DEVICE | Site: HIP | Status: FUNCTIONAL

## 2022-04-08 DEVICE — HIP H4 HEMI UNIV BIPLR IMPL CAPPED H4: Type: IMPLANTABLE DEVICE | Site: HIP | Status: FUNCTIONAL

## 2022-04-08 DEVICE — IMPLANTABLE DEVICE: Type: IMPLANTABLE DEVICE | Site: HIP | Status: FUNCTIONAL

## 2022-04-08 RX ORDER — SODIUM CHLORIDE 9 MG/ML
25 INJECTION, SOLUTION INTRAVENOUS PRN
Status: DISCONTINUED | OUTPATIENT
Start: 2022-04-08 | End: 2022-04-08 | Stop reason: HOSPADM

## 2022-04-08 RX ORDER — OXYCODONE HYDROCHLORIDE 5 MG/1
5 TABLET ORAL EVERY 6 HOURS PRN
Status: DISCONTINUED | OUTPATIENT
Start: 2022-04-08 | End: 2022-04-14 | Stop reason: HOSPADM

## 2022-04-08 RX ORDER — SODIUM CHLORIDE 0.9 % (FLUSH) 0.9 %
5-40 SYRINGE (ML) INJECTION PRN
Status: DISCONTINUED | OUTPATIENT
Start: 2022-04-08 | End: 2022-04-08 | Stop reason: HOSPADM

## 2022-04-08 RX ORDER — DEXAMETHASONE SODIUM PHOSPHATE 4 MG/ML
INJECTION, SOLUTION INTRA-ARTICULAR; INTRALESIONAL; INTRAMUSCULAR; INTRAVENOUS; SOFT TISSUE PRN
Status: DISCONTINUED | OUTPATIENT
Start: 2022-04-08 | End: 2022-04-08 | Stop reason: SDUPTHER

## 2022-04-08 RX ORDER — FENTANYL CITRATE 50 UG/ML
25 INJECTION, SOLUTION INTRAMUSCULAR; INTRAVENOUS EVERY 5 MIN PRN
Status: DISCONTINUED | OUTPATIENT
Start: 2022-04-08 | End: 2022-04-08

## 2022-04-08 RX ORDER — BUPIVACAINE HYDROCHLORIDE 5 MG/ML
INJECTION, SOLUTION EPIDURAL; INTRACAUDAL PRN
Status: DISCONTINUED | OUTPATIENT
Start: 2022-04-08 | End: 2022-04-08 | Stop reason: ALTCHOICE

## 2022-04-08 RX ORDER — TRAMADOL HYDROCHLORIDE 50 MG/1
50 TABLET ORAL EVERY 4 HOURS PRN
Status: DISCONTINUED | OUTPATIENT
Start: 2022-04-08 | End: 2022-04-14 | Stop reason: HOSPADM

## 2022-04-08 RX ORDER — ONDANSETRON 2 MG/ML
4 INJECTION INTRAMUSCULAR; INTRAVENOUS
Status: DISCONTINUED | OUTPATIENT
Start: 2022-04-08 | End: 2022-04-08 | Stop reason: HOSPADM

## 2022-04-08 RX ORDER — OXYCODONE HYDROCHLORIDE 5 MG/1
10 TABLET ORAL EVERY 6 HOURS PRN
Status: DISCONTINUED | OUTPATIENT
Start: 2022-04-08 | End: 2022-04-14 | Stop reason: HOSPADM

## 2022-04-08 RX ORDER — SODIUM CHLORIDE, SODIUM LACTATE, POTASSIUM CHLORIDE, CALCIUM CHLORIDE 600; 310; 30; 20 MG/100ML; MG/100ML; MG/100ML; MG/100ML
INJECTION, SOLUTION INTRAVENOUS CONTINUOUS
Status: DISCONTINUED | OUTPATIENT
Start: 2022-04-08 | End: 2022-04-12

## 2022-04-08 RX ORDER — MEPERIDINE HYDROCHLORIDE 25 MG/ML
12.5 INJECTION INTRAMUSCULAR; INTRAVENOUS; SUBCUTANEOUS EVERY 5 MIN PRN
Status: DISCONTINUED | OUTPATIENT
Start: 2022-04-08 | End: 2022-04-08 | Stop reason: HOSPADM

## 2022-04-08 RX ORDER — GLYCOPYRROLATE 1 MG/5 ML
SYRINGE (ML) INTRAVENOUS PRN
Status: DISCONTINUED | OUTPATIENT
Start: 2022-04-08 | End: 2022-04-08 | Stop reason: SDUPTHER

## 2022-04-08 RX ORDER — HYDRALAZINE HYDROCHLORIDE 20 MG/ML
10 INJECTION INTRAMUSCULAR; INTRAVENOUS
Status: DISCONTINUED | OUTPATIENT
Start: 2022-04-08 | End: 2022-04-08 | Stop reason: HOSPADM

## 2022-04-08 RX ORDER — ROCURONIUM BROMIDE 10 MG/ML
INJECTION, SOLUTION INTRAVENOUS PRN
Status: DISCONTINUED | OUTPATIENT
Start: 2022-04-08 | End: 2022-04-08 | Stop reason: SDUPTHER

## 2022-04-08 RX ORDER — LABETALOL HYDROCHLORIDE 5 MG/ML
10 INJECTION, SOLUTION INTRAVENOUS
Status: DISCONTINUED | OUTPATIENT
Start: 2022-04-08 | End: 2022-04-08 | Stop reason: HOSPADM

## 2022-04-08 RX ORDER — SODIUM CHLORIDE 9 MG/ML
INJECTION, SOLUTION INTRAVENOUS PRN
Status: DISCONTINUED | OUTPATIENT
Start: 2022-04-08 | End: 2022-04-08 | Stop reason: HOSPADM

## 2022-04-08 RX ORDER — FENTANYL CITRATE 50 UG/ML
INJECTION, SOLUTION INTRAMUSCULAR; INTRAVENOUS PRN
Status: DISCONTINUED | OUTPATIENT
Start: 2022-04-08 | End: 2022-04-08 | Stop reason: SDUPTHER

## 2022-04-08 RX ORDER — ONDANSETRON 2 MG/ML
INJECTION INTRAMUSCULAR; INTRAVENOUS PRN
Status: DISCONTINUED | OUTPATIENT
Start: 2022-04-08 | End: 2022-04-08 | Stop reason: SDUPTHER

## 2022-04-08 RX ORDER — SODIUM CHLORIDE 0.9 % (FLUSH) 0.9 %
5-40 SYRINGE (ML) INJECTION EVERY 12 HOURS SCHEDULED
Status: DISCONTINUED | OUTPATIENT
Start: 2022-04-08 | End: 2022-04-08 | Stop reason: HOSPADM

## 2022-04-08 RX ORDER — PHENYLEPHRINE HYDROCHLORIDE 10 MG/ML
INJECTION INTRAVENOUS PRN
Status: DISCONTINUED | OUTPATIENT
Start: 2022-04-08 | End: 2022-04-08 | Stop reason: SDUPTHER

## 2022-04-08 RX ORDER — ACETAMINOPHEN 500 MG
1000 TABLET ORAL EVERY 8 HOURS SCHEDULED
Status: DISCONTINUED | OUTPATIENT
Start: 2022-04-08 | End: 2022-04-14 | Stop reason: HOSPADM

## 2022-04-08 RX ORDER — OXYCODONE HYDROCHLORIDE 5 MG/1
5 TABLET ORAL
Status: DISCONTINUED | OUTPATIENT
Start: 2022-04-08 | End: 2022-04-08 | Stop reason: HOSPADM

## 2022-04-08 RX ORDER — KETOROLAC TROMETHAMINE 30 MG/ML
15 INJECTION, SOLUTION INTRAMUSCULAR; INTRAVENOUS EVERY 6 HOURS
Status: DISPENSED | OUTPATIENT
Start: 2022-04-08 | End: 2022-04-10

## 2022-04-08 RX ORDER — PROPOFOL 10 MG/ML
INJECTION, EMULSION INTRAVENOUS PRN
Status: DISCONTINUED | OUTPATIENT
Start: 2022-04-08 | End: 2022-04-08 | Stop reason: SDUPTHER

## 2022-04-08 RX ADMIN — SODIUM CHLORIDE: 900 INJECTION, SOLUTION INTRAVENOUS at 15:00

## 2022-04-08 RX ADMIN — SODIUM CHLORIDE, PRESERVATIVE FREE 10 ML: 5 INJECTION INTRAVENOUS at 20:49

## 2022-04-08 RX ADMIN — SUGAMMADEX 200 MG: 100 INJECTION, SOLUTION INTRAVENOUS at 16:40

## 2022-04-08 RX ADMIN — ROCURONIUM BROMIDE 45 MG: 10 INJECTION INTRAVENOUS at 15:12

## 2022-04-08 RX ADMIN — FENTANYL CITRATE 50 MCG: 50 INJECTION, SOLUTION INTRAMUSCULAR; INTRAVENOUS at 15:29

## 2022-04-08 RX ADMIN — PHENYLEPHRINE HYDROCHLORIDE 50 MCG: 10 INJECTION INTRAVENOUS at 15:59

## 2022-04-08 RX ADMIN — CEFAZOLIN SODIUM 2000 MG: 10 INJECTION, POWDER, FOR SOLUTION INTRAVENOUS at 23:37

## 2022-04-08 RX ADMIN — HYDROMORPHONE HYDROCHLORIDE 0.25 MG: 1 INJECTION, SOLUTION INTRAMUSCULAR; INTRAVENOUS; SUBCUTANEOUS at 17:38

## 2022-04-08 RX ADMIN — HYDROMORPHONE HYDROCHLORIDE 0.5 MG: 1 INJECTION, SOLUTION INTRAMUSCULAR; INTRAVENOUS; SUBCUTANEOUS at 09:19

## 2022-04-08 RX ADMIN — PHENYLEPHRINE HYDROCHLORIDE 50 MCG: 10 INJECTION INTRAVENOUS at 16:06

## 2022-04-08 RX ADMIN — PROPOFOL 40 MG: 10 INJECTION, EMULSION INTRAVENOUS at 15:38

## 2022-04-08 RX ADMIN — ISOSORBIDE MONONITRATE 30 MG: 30 TABLET, EXTENDED RELEASE ORAL at 09:15

## 2022-04-08 RX ADMIN — ONDANSETRON 4 MG: 2 INJECTION INTRAMUSCULAR; INTRAVENOUS at 15:25

## 2022-04-08 RX ADMIN — HYDROMORPHONE HYDROCHLORIDE 0.25 MG: 1 INJECTION, SOLUTION INTRAMUSCULAR; INTRAVENOUS; SUBCUTANEOUS at 17:50

## 2022-04-08 RX ADMIN — SODIUM CHLORIDE, PRESERVATIVE FREE 10 ML: 5 INJECTION INTRAVENOUS at 09:15

## 2022-04-08 RX ADMIN — HYDROMORPHONE HYDROCHLORIDE 0.25 MG: 1 INJECTION, SOLUTION INTRAMUSCULAR; INTRAVENOUS; SUBCUTANEOUS at 17:32

## 2022-04-08 RX ADMIN — SODIUM CHLORIDE: 900 INJECTION, SOLUTION INTRAVENOUS at 17:17

## 2022-04-08 RX ADMIN — HYDROMORPHONE HYDROCHLORIDE 0.5 MG: 1 INJECTION, SOLUTION INTRAMUSCULAR; INTRAVENOUS; SUBCUTANEOUS at 01:57

## 2022-04-08 RX ADMIN — HYDRALAZINE HYDROCHLORIDE 10 MG: 20 INJECTION, SOLUTION INTRAMUSCULAR; INTRAVENOUS at 17:10

## 2022-04-08 RX ADMIN — TRANEXAMIC ACID 1000 MG: 1 INJECTION, SOLUTION INTRAVENOUS at 15:28

## 2022-04-08 RX ADMIN — PHENYLEPHRINE HYDROCHLORIDE 100 MCG: 10 INJECTION INTRAVENOUS at 15:41

## 2022-04-08 RX ADMIN — SODIUM CHLORIDE, POTASSIUM CHLORIDE, SODIUM LACTATE AND CALCIUM CHLORIDE: 600; 310; 30; 20 INJECTION, SOLUTION INTRAVENOUS at 12:16

## 2022-04-08 RX ADMIN — HYDROMORPHONE HYDROCHLORIDE 0.5 MG: 1 INJECTION, SOLUTION INTRAMUSCULAR; INTRAVENOUS; SUBCUTANEOUS at 12:47

## 2022-04-08 RX ADMIN — SENNOSIDES AND DOCUSATE SODIUM 1 TABLET: 50; 8.6 TABLET ORAL at 20:49

## 2022-04-08 RX ADMIN — KETOROLAC TROMETHAMINE 15 MG: 30 INJECTION, SOLUTION INTRAMUSCULAR; INTRAVENOUS at 20:48

## 2022-04-08 RX ADMIN — ACETAMINOPHEN 1000 MG: 500 TABLET ORAL at 20:49

## 2022-04-08 RX ADMIN — PHENYLEPHRINE HYDROCHLORIDE 50 MCG: 10 INJECTION INTRAVENOUS at 16:03

## 2022-04-08 RX ADMIN — Medication 0.1 MG: at 15:05

## 2022-04-08 RX ADMIN — ROCURONIUM BROMIDE 5 MG: 10 INJECTION INTRAVENOUS at 15:11

## 2022-04-08 RX ADMIN — DEXAMETHASONE SODIUM PHOSPHATE 4 MG: 4 INJECTION, SOLUTION INTRAMUSCULAR; INTRAVENOUS at 15:25

## 2022-04-08 RX ADMIN — PROPOFOL 20 MG: 10 INJECTION, EMULSION INTRAVENOUS at 15:48

## 2022-04-08 RX ADMIN — PHENYLEPHRINE HYDROCHLORIDE 100 MCG: 10 INJECTION INTRAVENOUS at 15:18

## 2022-04-08 RX ADMIN — HYDROMORPHONE HYDROCHLORIDE 0.5 MG: 1 INJECTION, SOLUTION INTRAMUSCULAR; INTRAVENOUS; SUBCUTANEOUS at 06:32

## 2022-04-08 RX ADMIN — FENTANYL CITRATE 50 MCG: 50 INJECTION, SOLUTION INTRAMUSCULAR; INTRAVENOUS at 15:38

## 2022-04-08 RX ADMIN — HYDROMORPHONE HYDROCHLORIDE 0.5 MG: 1 INJECTION, SOLUTION INTRAMUSCULAR; INTRAVENOUS; SUBCUTANEOUS at 04:49

## 2022-04-08 RX ADMIN — PROPOFOL 60 MG: 10 INJECTION, EMULSION INTRAVENOUS at 15:10

## 2022-04-08 RX ADMIN — METOPROLOL SUCCINATE 25 MG: 25 TABLET, EXTENDED RELEASE ORAL at 09:15

## 2022-04-08 RX ADMIN — CEFAZOLIN SODIUM 2000 MG: 10 INJECTION, POWDER, FOR SOLUTION INTRAVENOUS at 15:23

## 2022-04-08 ASSESSMENT — PAIN DESCRIPTION - LOCATION
LOCATION: HIP

## 2022-04-08 ASSESSMENT — ENCOUNTER SYMPTOMS
DYSPNEA ACTIVITY LEVEL: NO INTERVAL CHANGE
SHORTNESS OF BREATH: 0

## 2022-04-08 ASSESSMENT — PULMONARY FUNCTION TESTS
PIF_VALUE: 17
PIF_VALUE: 16
PIF_VALUE: 13
PIF_VALUE: 13
PIF_VALUE: 18
PIF_VALUE: 16
PIF_VALUE: 1
PIF_VALUE: 19
PIF_VALUE: 16
PIF_VALUE: 8
PIF_VALUE: 17
PIF_VALUE: 16
PIF_VALUE: 16
PIF_VALUE: 18
PIF_VALUE: 19
PIF_VALUE: 19
PIF_VALUE: 2
PIF_VALUE: 18
PIF_VALUE: 16
PIF_VALUE: 18
PIF_VALUE: 17
PIF_VALUE: 20
PIF_VALUE: 16
PIF_VALUE: 19
PIF_VALUE: 2
PIF_VALUE: 17
PIF_VALUE: 14
PIF_VALUE: 2
PIF_VALUE: 16
PIF_VALUE: 26
PIF_VALUE: 18
PIF_VALUE: 2
PIF_VALUE: 18
PIF_VALUE: 2
PIF_VALUE: 13
PIF_VALUE: 18
PIF_VALUE: 1
PIF_VALUE: 18
PIF_VALUE: 18
PIF_VALUE: 19
PIF_VALUE: 2
PIF_VALUE: 19
PIF_VALUE: 16
PIF_VALUE: 18
PIF_VALUE: 16
PIF_VALUE: 6
PIF_VALUE: 16
PIF_VALUE: 19
PIF_VALUE: 18
PIF_VALUE: 19
PIF_VALUE: 19
PIF_VALUE: 18
PIF_VALUE: 19
PIF_VALUE: 17
PIF_VALUE: 19
PIF_VALUE: 13
PIF_VALUE: 18
PIF_VALUE: 1
PIF_VALUE: 16
PIF_VALUE: 19
PIF_VALUE: 2
PIF_VALUE: 16
PIF_VALUE: 18
PIF_VALUE: 17
PIF_VALUE: 18
PIF_VALUE: 17
PIF_VALUE: 18
PIF_VALUE: 16
PIF_VALUE: 3
PIF_VALUE: 3
PIF_VALUE: 18
PIF_VALUE: 18
PIF_VALUE: 16
PIF_VALUE: 19
PIF_VALUE: 16
PIF_VALUE: 17
PIF_VALUE: 18
PIF_VALUE: 17
PIF_VALUE: 18
PIF_VALUE: 15
PIF_VALUE: 1
PIF_VALUE: 2
PIF_VALUE: 18
PIF_VALUE: 18
PIF_VALUE: 2
PIF_VALUE: 19
PIF_VALUE: 16
PIF_VALUE: 18
PIF_VALUE: 16
PIF_VALUE: 19
PIF_VALUE: 1
PIF_VALUE: 19
PIF_VALUE: 18
PIF_VALUE: 17
PIF_VALUE: 19
PIF_VALUE: 16
PIF_VALUE: 19
PIF_VALUE: 1
PIF_VALUE: 17
PIF_VALUE: 16
PIF_VALUE: 16

## 2022-04-08 ASSESSMENT — PAIN SCALES - WONG BAKER: WONGBAKER_NUMERICALRESPONSE: 0

## 2022-04-08 ASSESSMENT — PAIN DESCRIPTION - PAIN TYPE
TYPE: ACUTE PAIN

## 2022-04-08 ASSESSMENT — PAIN SCALES - GENERAL
PAINLEVEL_OUTOF10: 0
PAINLEVEL_OUTOF10: 1
PAINLEVEL_OUTOF10: 8
PAINLEVEL_OUTOF10: 4
PAINLEVEL_OUTOF10: 7
PAINLEVEL_OUTOF10: 8
PAINLEVEL_OUTOF10: 8
PAINLEVEL_OUTOF10: 0
PAINLEVEL_OUTOF10: 7

## 2022-04-08 ASSESSMENT — PAIN DESCRIPTION - ONSET
ONSET: ON-GOING
ONSET: ON-GOING

## 2022-04-08 ASSESSMENT — PAIN DESCRIPTION - PROGRESSION
CLINICAL_PROGRESSION: NOT CHANGED
CLINICAL_PROGRESSION: NOT CHANGED
CLINICAL_PROGRESSION: GRADUALLY IMPROVING

## 2022-04-08 ASSESSMENT — PAIN DESCRIPTION - ORIENTATION
ORIENTATION: LEFT
ORIENTATION: OUTER
ORIENTATION: LEFT

## 2022-04-08 ASSESSMENT — PAIN DESCRIPTION - FREQUENCY
FREQUENCY: CONTINUOUS

## 2022-04-08 ASSESSMENT — PAIN - FUNCTIONAL ASSESSMENT: PAIN_FUNCTIONAL_ASSESSMENT: PREVENTS OR INTERFERES SOME ACTIVE ACTIVITIES AND ADLS

## 2022-04-08 ASSESSMENT — PAIN DESCRIPTION - DESCRIPTORS
DESCRIPTORS: ACHING

## 2022-04-08 NOTE — ANESTHESIA POSTPROCEDURE EVALUATION
Department of Anesthesiology  Postprocedure Note    Patient: Thania Ivey  MRN: [id number]  YOB: 1935  Date of evaluation: 4/8/2022  Time:  6:10 PM     Procedure Summary     Date: 04/08/22 Room / Location: [address]    Anesthesia Start: 9023 Anesthesia Stop: 1703    Procedure: LEFT HIP HEMIARTHROPLASTY (Left ) Diagnosis: (LEFT HIP FRACTURE)    Surgeons: Bayron Mcclure DO Responsible Provider: Sayra Jean MD    Anesthesia Type: general ASA Status: 3          Anesthesia Type: general    Lorenzo Phase I: Lorenzo Score: 3    Lorenzo Phase II:      Last vitals: Reviewed and per EMR flowsheets.        Anesthesia Post Evaluation    Patient location during evaluation: PACU  Patient participation: complete - patient participated  Level of consciousness: awake  Airway patency: patent  Nausea & Vomiting: no nausea and no vomiting  Complications: no  Cardiovascular status: hemodynamically stable  Respiratory status: acceptable  Hydration status: euvolemic  Multimodal analgesia pain management approach

## 2022-04-08 NOTE — PROGRESS NOTES
Patient arrived from OR to PACU # 7 s/p LEFT HIP HEMIARTHROPLASTY (Left ) per . Attached to PACU monitoring device, report received from CRNA who reported no problems intraoperatively. Patient with shallow respirations and not moving much air at all. Additional suggamedex 200 mg given per anesthesia. Improved breathing noted patient awakening, oral airway removed. B/p elevated will continue to monitor.

## 2022-04-08 NOTE — PROGRESS NOTES
Patient arrived with granddaughter, A&O x 3. She c/o pain with movement, Blood pressure 155/78, other vital signs stable, pt. shows no signs of distress.

## 2022-04-08 NOTE — OP NOTE
Operative Note      Patient: Ivan Jason  YOB: 1935  MRN: [id number]    Date of Procedure: 4/8/2022    Pre-Op Diagnosis: LEFT FEMORAL NECK FRACTURE    Post-Op Diagnosis: Same       Procedure(s):  LEFT HIP HEMIARTHROPLASTY    Surgeon(s):  Leah Boyce DO    Assistant:   Surgical Assistant: Ashly Torres; Cha Castillo    Anesthesia: General plus 30cc 0.5% marcaine no epi local anesthetic    Estimated Blood Loss (mL): less than 371     Complications: None    Specimens:   * No specimens in log *    Implants:  * No implants in log *      Drains:   Urethral Catheter Non-latex;Straight-tip 16 fr (Active)       Findings: as above    Detailed Description of Procedure: On the day of the operation the patient was met in the preoperative holding area.  The operative extremity was signed by myself and all appropriate preoperative paperwork was completed.  The patient was then taken back to the operating room suite.  The patient was intubated and general endotracheal anesthesia was administered without complication.  The patient was transferred over to the OR table where she was positioned in the left lateral decubitus position making sure to pad all bony prominences appropriately.  Ancef 2 g IV was administered less than 30 minutes prior to surgical incision for surgical site infection prophylaxis.  TXA 1 g IV was administered for bleeding prophylaxis.  An SCD was applied to the nonoperative extremity.   A preoperative alcohol scrub was performed of the operative extremity.  The operative extremity was then prepped and draped in the usual sterile fashion.  A final timeout was performed.  A 12 cm longitudinal incision was made beginning 3 fingerbreadths proximal to the greater trochanter and extended distally in line with the femoral shaft.  Dissection was carried down through the skin and subcutaneous tissue sharply with a 10 blade scalpel.  Meticulous hemostasis was achieved with Bovie electrocautery.  A standard direct lateral approach to the hip was undertaken. We did make a femoral neck osteotomy 1 fingerbreadth proximal to the lesser trochanter of the proximal femur.  the femoral head was removed with a corkscrew. The acetabular cartilage was inspected and found to be in good condition. The acetabulum was then copiously irrigated and cleaned of all remaining blood clot, saline, and debris.  We next turned our attention to the proximal femur preparation.  The operative extremity was placed into a sterile leg bag.  The proximal femur was elevated with a Tsai retractor and a femoral neck elevator.  A initial lateralizing osteotomy was made with a box osteotome.  The femoral canal was entered with our standard canal finder.  The osteotomy was lateralized further with our lateral trochanteric reamer.  We sequentially broached up to a size 3 Ivelisse Avenir complete proximally porous-coated femoral broach.  We sequentially trialed and found that a standard stem with a +0 neck adapter on a 44 mm head gave us excellent range of motion of the hip with no evidence of instability in any plane and near equal recuperation of leg lengths.  The hip was then dislocated and all trial components were removed.  The femoral canal was irrigated copiously and dried.  The wound itself was irrigated with 3 L of sterile saline via Pulsavac lavage.  We inserted our Ivelisse Avenir complete size 3 standard offset proximally porous-coated femoral stem.  The stem was impacted into its appropriate position.  The femoral trunnion was irrigated and dried.  We implanted our 44 mm monopolar head ball with +0 neck adapter and impacted this onto the trunnion.  The Monreal taper was found to be engaged.  The hip was reduced.  We again noted that we had excellent range of motion of the hip with no evidence of instability in any plane and near equal recuperation of leg lengths.  The operative extremity was then placed on a sterile Binh licea.  The hip abductors were repaired with a combination of #2 FiberWire suture in simple figure-of-eight fashion.  This repair was then overrun with a #1 Vicryl suture in running locking fashion.  The IT band fascia was repaired with a combination of #2 FiberWire suture in simple figure-of-eight fashion and a #2 strata fix barbed suture in running locking fashion.  Kang's fascia was closed with 0 Vicryl suture in simple inverted interrupted fashion.  The subcutaneous tissues were repaired with 3-0 Vicryl suture in simple inverted interrupted fashion.  Skin incision was closed with 3-0 Monocryl suture in running subcuticular fashion.  The incision was secured with half-inch Steri-Strips.  The patient was then extubated and awoken from general endotracheal anesthesia without complication.  The patient was transferred from the OR table to her hospital bed and then to the postanesthesia care unit in stable condition.      Electronically signed by Isabella Barakat DO on 4/8/2022 at 4:17 PM

## 2022-04-08 NOTE — ANESTHESIA PRE PROCEDURE
Department of Anesthesiology  Preprocedure Note       Name:  Deirdre Haskins   Age:  81 y.o.  :  1935                                          MRN:  [id number]         Date:  2022      Surgeon: Blair Tolbert):  Daysi Calixto DO    Procedure: Procedure(s):  LEFT HIP HEMIARTHROPLASTY    Medications prior to admission:   Prior to Admission medications    Medication Sig Start Date End Date Taking?  Authorizing Provider   aspirin 81 MG chewable tablet Take 81 mg by mouth daily   Yes Historical Provider, MD   atorvastatin (LIPITOR) 20 MG tablet Take 20 mg by mouth daily   Yes Historical Provider, MD   metoprolol succinate (TOPROL XL) 25 MG extended release tablet Take 25 mg by mouth daily 1/2 tab daily   Yes Historical Provider, MD   loratadine (CLARITIN) 10 MG capsule Take 10 mg by mouth daily   Yes Historical Provider, MD   fluticasone (FLONASE) 50 MCG/ACT nasal spray 1 spray by Each Nostril route daily   Yes Historical Provider, MD   isosorbide mononitrate (IMDUR) 30 MG extended release tablet Take 30 mg by mouth daily   Yes Historical Provider, MD       Current medications:    Current Facility-Administered Medications   Medication Dose Route Frequency Provider Last Rate Last Admin    ceFAZolin (ANCEF) 2000 mg in dextrose 5 % 50 mL IVPB  2,000 mg IntraVENous On Call to R [address], DO        tranexamic acid (CYKLOKAPRON) 1,000 mg in sodium chloride 0.9 % 50 mL IVPB  1,000 mg IntraVENous Once Daysi Calixto DO        lactated ringers infusion   IntraVENous Continuous Ina Saunders MD 50 mL/hr at 22 1216 New Bag at 22 1216    atorvastatin (LIPITOR) tablet 20 mg  20 mg Oral Daily Crystal Landers MD        fluticasone (FLONASE) 50 MCG/ACT nasal spray 1 spray  1 spray Each Nostril Daily Crystal Landers MD        isosorbide mononitrate (IMDUR) extended release tablet 30 mg  30 mg Oral Daily Crystal Landers MD   30 mg at 22 0915    metoprolol succinate (TOPROL XL) extended release tablet 25 mg  25 mg Oral Daily Tiffany Ramos MD   25 mg at 04/08/22 0915    cetirizine (ZYRTEC) tablet 10 mg  10 mg Oral Daily Tiffany Ramos MD        sodium chloride flush 0.9 % injection 10 mL  10 mL IntraVENous 2 times per day Tiffany Ramos MD   10 mL at 04/08/22 0915    sodium chloride flush 0.9 % injection 10 mL  10 mL IntraVENous PRN Tiffany Ramos MD        0.9 % sodium chloride infusion   IntraVENous PRN Tiffany Ramos MD        promethazine (PHENERGAN) tablet 12.5 mg  12.5 mg Oral Q6H PRN Tiffany Ramos MD        Or    ondansetron [facility]) injection 4 mg  4 mg IntraVENous Q6H PRN Tiffany Ramos MD   4 mg at 04/07/22 2202    sennosides-docusate sodium (SENOKOT-S) 8.6-50 MG tablet 1 tablet  1 tablet Oral BID Tiffany Ramos MD   1 tablet at 04/07/22 2034    polyethylene glycol (GLYCOLAX) packet 17 g  17 g Oral Daily PRN Tiffany Ramos MD        acetaminophen (TYLENOL) tablet 650 mg  650 mg Oral Q6H PRN Tiffany Ramos MD        Or   [facility] acetaminophen (TYLENOL) suppository 650 mg  650 mg Rectal Q6H PRN Tiffany Ramos MD        HYDROmorphone (DILAUDID) injection 0.25 mg  0.25 mg IntraVENous Q2H PRN Tiffany Ramos MD        Or   [facility] HYDROmorphone (DILAUDID) injection 0.5 mg  0.5 mg IntraVENous Q2H PRN Tiffany Ramos MD   0.5 mg at 04/08/22 1247       Allergies:  No Known Allergies    Problem List:    Patient Active Problem List   Diagnosis Code    [facility] S72.002A       Past Medical History:  History reviewed. No pertinent past medical history. Past Surgical History:  History reviewed. No pertinent surgical history.     Social History:    Social History     Tobacco Use    Smoking status: Never Smoker    Smokeless tobacco: Never Used   Substance Use Topics    Alcohol use: Never                                Counseling given: Not Answered      Vital Signs (Current):   Vitals:    04/07/22 2300 04/08/22 0345 04/08/22 0800 04/08/22 1100   BP: (!) 151/84 (!) 152/73 (!) 154/81 121/69   Pulse: 75 80 76 75   Resp: 15 17 16 16   Temp: 97.9 °F (36.6 °C) 97.7 °F (36.5 °C) 97.6 °F (36.4 °C) 97.6 °F (36.4 °C)   TempSrc: Oral Oral Oral Oral   SpO2: 90% 93% 93% 92%   Weight:       Height:                                                  BP Readings from Last 3 Encounters:   04/08/22 121/69       NPO Status:                                                                                 BMI:   Wt Readings from Last 3 Encounters:   04/07/22 128 lb 12.8 oz (58.4 kg)     Body mass index is 23.56 kg/m². CBC:   Lab Results   Component Value Date    WBC 10.6 04/08/2022    RBC 4.09 04/08/2022    HGB 13.2 04/08/2022    HCT 38.8 04/08/2022    MCV 94.8 04/08/2022    RDW 13.1 04/08/2022     04/08/2022       CMP:   Lab Results   Component Value Date     04/08/2022    K 4.4 04/08/2022     04/08/2022    CO2 20 04/08/2022    BUN 20 04/08/2022    CREATININE 0.6 04/08/2022    GFRAA >60 04/08/2022    LABGLOM >60 04/08/2022    GLUCOSE 121 04/08/2022    CALCIUM 9.5 04/08/2022       POC Tests: No results for input(s): POCGLU, POCNA, POCK, POCCL, POCBUN, POCHEMO, POCHCT in the last 72 hours.     Coags:   Lab Results   Component Value Date    PROTIME 11.8 04/07/2022    INR 1.04 04/07/2022       HCG (If Applicable): No results found for: PREGTESTUR, PREGSERUM, HCG, HCGQUANT     ABGs: No results found for: PHART, PO2ART, UZI9VVA, XDD0RTE, BEART, I9SWDNKX     Type & Screen (If Applicable):  No results found for: LABABO, LABRH    Drug/Infectious Status (If Applicable):  No results found for: HIV, HEPCAB    COVID-19 Screening (If Applicable):   Lab Results   Component Value Date    COVID19 Not Detected 04/07/2022           Anesthesia Evaluation  Patient summary reviewed and Nursing notes reviewed no history of anesthetic complications:   Airway: Mallampati: II  TM distance: >3 FB   Neck ROM: full  Mouth opening: > = 3 FB Dental: normal exam         Pulmonary:normal exam        (-) shortness of breath                           Cardiovascular:  Exercise tolerance: poor (<4 METS),   (+) SAUCEDA: no interval change,     (-)  angina, orthopnea and PND      Rhythm: regular  Rate: normal           Beta Blocker:  Dose within 24 Hrs         Neuro/Psych:               GI/Hepatic/Renal:             Endo/Other:                     Abdominal:         (-) obese Abdomen: soft. Vascular: Other Findings:             Anesthesia Plan      general     ASA 3       Induction: intravenous. MIPS: Postoperative opioids intended and Prophylactic antiemetics administered. Anesthetic plan and risks discussed with patient. Plan discussed with CRNA and attending.                   Alejandro Sheikh DO   4/8/2022

## 2022-04-08 NOTE — PROGRESS NOTES
PACU Transfer to Floor Note    Procedure(s):  LEFT HIP HEMIARTHROPLASTY    Current Allergies: Patient has no known allergies. Pt meets criteria as per Sam Score and ASPAN Standards to transfer to next phase of care. No results for input(s): POCGLU in the last 72 hours. Vitals:    04/08/22 1830   BP: 136/87   Pulse: 90   Resp: 22   Temp: 98.1 °F (36.7 °C)   SpO2: 93%     Vitals within 20% of pt's admission vitals as per SAM SCORE    SpO2: 93 %    O2 Flow Rate (L/min): 2 L/min      Intake/Output Summary (Last 24 hours) at 4/8/2022 1836  Last data filed at 4/8/2022 1717  Gross per 24 hour   Intake 1010 ml   Output 200 ml   Net 810 ml       Pain assessment:  receiving treatment      Patient was assessed for alterations to skin integrity. There were not alterations observed.     Is patient incontinent: no     Handoff report given to Abdiel-Fred Ernst updated and directed to pt room      4/8/2022 6:36 PM

## 2022-04-08 NOTE — PROGRESS NOTES
Physical Therapy/Occupational Therapy    Orders received and chart reviewed. Noted pt with left displaced femoral neck fracture and scheduled for L hip hemiarthroplasty. Will sign off and await new orders post op.     April E[place], [place] [address]  Jen Torres  MS, OTR/L #[id number]

## 2022-04-08 NOTE — CONSULTS
Department of Orthopedic Surgery  Attending Consult Note        Reason for Consult:  Left hip fracture  Requesting Physician:  Dr. Clarisa Garay:  Left hip pain    History Obtained From:  patient    HISTORY OF PRESENT ILLNESS:                The patient is a 81 y.o. female who presents with pain in the left hip that began today when she had a mechanical fall to her left hip and has persisted since that time. There was no loss of consciousness. She has not hit her head. She has been otherwise feeling well.     Found to have a left femoral neck fracture. Admitted for further management. .    Past Medical History:    History reviewed. No pertinent past medical history. Past Surgical History:    History reviewed. No pertinent surgical history.   Current Medications:   Current Facility-Administered Medications: ceFAZolin (ANCEF) 2000 mg in dextrose 5 % 50 mL IVPB, 2,000 mg, IntraVENous, On Call to OR  tranexamic acid (CYKLOKAPRON) 1,000 mg in sodium chloride 0.9 % 50 mL IVPB, 1,000 mg, IntraVENous, Once  atorvastatin (LIPITOR) tablet 20 mg, 20 mg, Oral, Daily  fluticasone (FLONASE) 50 MCG/ACT nasal spray 1 spray, 1 spray, Each Nostril, Daily  isosorbide mononitrate (IMDUR) extended release tablet 30 mg, 30 mg, Oral, Daily  metoprolol succinate (TOPROL XL) extended release tablet 25 mg, 25 mg, Oral, Daily  cetirizine (ZYRTEC) tablet 10 mg, 10 mg, Oral, Daily  sodium chloride flush 0.9 % injection 10 mL, 10 mL, IntraVENous, 2 times per day  sodium chloride flush 0.9 % injection 10 mL, 10 mL, IntraVENous, PRN  0.9 % sodium chloride infusion, , IntraVENous, PRN  promethazine (PHENERGAN) tablet 12.5 mg, 12.5 mg, Oral, Q6H PRN **OR** ondansetron (ZOFRAN) injection 4 mg, 4 mg, IntraVENous, Q6H PRN  sennosides-docusate sodium (SENOKOT-S) 8.6-50 MG tablet 1 tablet, 1 tablet, Oral, BID  polyethylene glycol (GLYCOLAX) packet 17 g, 17 g, Oral, Daily PRN  acetaminophen (TYLENOL) tablet 650 mg, 650 mg, Oral, Q6H PRN **OR** acetaminophen (TYLENOL) suppository 650 mg, 650 mg, Rectal, Q6H PRN  HYDROmorphone (DILAUDID) injection 0.25 mg, 0.25 mg, IntraVENous, Q2H PRN **OR** HYDROmorphone (DILAUDID) injection 0.5 mg, 0.5 mg, IntraVENous, Q2H PRN  Allergies:  Patient has no known allergies. Social History:   None    Family History:   History reviewed. No pertinent family history. REVIEW OF SYSTEMS:    Left hip fracture    PHYSICAL EXAM:    VITALS:  BP (!) 152/73   Pulse 80   Temp 97.7 °F (36.5 °C) (Oral)   Resp 17   Ht 5' 2\" (1.575 m)   Wt 128 lb 12.8 oz (58.4 kg)   SpO2 93%   BMI 23.56 kg/m²   AAOx3  LLE demonstrates shortening/external rotation  Skin intact  +TTP left groin  Pain in left groin with passive logroll of left hip  Pelvis stable to AP and Lateral compression  LLE NV intact L1-S1 with motor/sensory function intact  +LLE palpable pedal pulses  Left thigh/leg compartments soft/compressible    DATA:    AP pelvis and 2 view left hip and femur radiographs demonstrate a displaced left femoral neck fracture    IMPRESSION/RECOMMENDATIONS:    Patient is OCTOR today for left hip hemiarthroplasty  Full consent for the procedure was obtained with all of the risks, benefits, and alternatives to surgical management being thoroughly discussed with the patient. All potential outcomes were discussed. No guarantees were implied or verbalized.   All parties agreed to proceed with surgery  Bedrest  NPO   Pain control  dvt prophylaxis  Medical optimization

## 2022-04-08 NOTE — PROGRESS NOTES
Resting much better after dilaudid 0.75 mg in 3 increments. Earlier was pulling items off,moaning & asking for momma. Asked if in pain said \"yes\".  called, updated & directed back to floor.  VSS

## 2022-04-08 NOTE — CARE COORDINATION
Pt has been out of her room for most of the day. SW has attempted to see her through out the day to complete initial assessment. Pt has no contact information listed. SW will follow up w/Pt post-op for DCP.      Electronically signed by KUSUM Lockett, KENRICK on 4/8/2022 at 6:10 PM  [phone]

## 2022-04-08 NOTE — PROGRESS NOTES
HOSPITALISTS PROGRESS NOTE    4/8/2022 1:20 PM        Name: Coretta Kenney . Admitted: 4/7/2022  Primary Care Provider: Berta Bates MD (Tel: None)      Problem List  Principal Problem:    Closed displaced fracture of left femoral neck ([address]  Resolved Problems:    * No resolved hospital problems. *       Assessment & Plan:   Left hip fracture  Sec to mechanical fall  H/o CAD but no active cardiac conditions, pulse regular,   Benefits outweigh risk  High risk of delirium, cruz op cardiac complications, family aware  NPO for surgery at this time     H/o Dementia  H/o CAD stable, s/p stents placed, more than 10 years ago   Recently moved from [place], [place] to live close to grand daughter     IV Access: peripheral  Donato: no  Diet: Diet NPO Exceptions are: Sips of Water with Meds  Code:Full Code  DVT PPX Lovenox  Disposition  Monitor in the hospital       Brief Course:  H/o dementia, CAD presenting with mechanical fall with left hip fracture. CC: hip pain    Subjective: Hattie Cast     Pleasant, grand daughter and  at bedside  Forgetful   No chest pain, no sob, no cough, no phlegm       Reviewed interval ancillary notes    Current Medications  ceFAZolin (ANCEF) 2000 mg in dextrose 5 % 50 mL IVPB, On Call to OR  tranexamic acid (CYKLOKAPRON) 1,000 mg in sodium chloride 0.9 % 50 mL IVPB, Once  lactated ringers infusion, Continuous  atorvastatin (LIPITOR) tablet 20 mg, Daily  fluticasone (FLONASE) 50 MCG/ACT nasal spray 1 spray, Daily  isosorbide mononitrate (IMDUR) extended release tablet 30 mg, Daily  metoprolol succinate (TOPROL XL) extended release tablet 25 mg, Daily  cetirizine (ZYRTEC) tablet 10 mg, Daily  sodium chloride flush 0.9 % injection 10 mL, 2 times per day  sodium chloride flush 0.9 % injection 10 mL, PRN  0.9 % sodium chloride infusion, PRN  promethazine (PHENERGAN) tablet 12.5 mg, Q6H PRN   Or  ondansetron (ZOFRAN) injection 4 mg, Q6H PRN  sennosides-docusate sodium (SENOKOT-S) 8.6-50 MG tablet 1 tablet, BID  polyethylene glycol (GLYCOLAX) packet 17 g, Daily PRN  acetaminophen (TYLENOL) tablet 650 mg, Q6H PRN   Or  acetaminophen (TYLENOL) suppository 650 mg, Q6H PRN  HYDROmorphone (DILAUDID) injection 0.25 mg, Q2H PRN   Or  HYDROmorphone (DILAUDID) injection 0.5 mg, Q2H PRN        Objective:  /69   Pulse 75   Temp 97.6 °F (36.4 °C) (Oral)   Resp 16   Ht 5' 2\" (1.575 m)   Wt 128 lb 12.8 oz (58.4 kg)   SpO2 92%   BMI 23.56 kg/m²     Intake/Output Summary (Last 24 hours) at 4/8/2022 1320  Last data filed at 4/7/2022 2040  Gross per 24 hour   Intake 10 ml   Output --   Net 10 ml      Wt Readings from Last 3 Encounters:   04/07/22 128 lb 12.8 oz (58.4 kg)       General appearance:  Appears comfortable  Eyes: Sclera clear. Pupils equal.  ENT: Moist oral mucosa. Trachea midline, no adenopathy. Cardiovascular: Regular rhythm, normal S1, S2. No murmur. No edema in lower extremities  Respiratory: Not using accessory muscles. Good inspiratory effort. Clear to auscultation bilaterally, no wheeze or crackles. GI: Abdomen soft, no tenderness, not distended, normal bowel sounds  Musculoskeletal: No cyanosis in digits, neck supple  Neurology: CN 2-12 grossly intact. No speech or motor deficits  Skin: Warm, dry, normal turgor  Extremity exam shows brisk capillary refill. Peripheral pulses are palpable in lower extremities     Labs and Tests:  CBC:   Recent Labs     04/07/22  1535 04/08/22  0600   WBC 7.6 10.6   HGB 13.6 13.2    201     BMP:    Recent Labs     04/07/22  1535 04/08/22  0600    139   K 4.2 4.4    107   CO2 21 20*   BUN 15 20   CREATININE 0.7 0.6   GLUCOSE 117* 121*     Hepatic: No results for input(s): AST, ALT, ALB, BILITOT, ALKPHOS in the last 72 hours. XR CHEST PORTABLE   Final Result   1. No evidence of acute cardiopulmonary disease.       XR PELVIS (1-2 VIEWS)   Final Result      Acute left femoral neck fracture. LEFT FEMUR 2 VIEWS      INDICATION: Fall. Fracture. FINDINGS: AP and lateral views left femur were obtained. Note is again made of the left femoral neck fracture. No other fractures are identified. No dislocation is identified. IMPRESSION:   1. Acute left femoral neck fracture. XR FEMUR LEFT (MIN 2 VIEWS)   Final Result      Acute left femoral neck fracture. LEFT FEMUR 2 VIEWS      INDICATION: Fall. Fracture. FINDINGS: AP and lateral views left femur were obtained. Note is again made of the left femoral neck fracture. No other fractures are identified. No dislocation is identified. IMPRESSION:   1. Acute left femoral neck fracture. CT Head WO Contrast   Final Result   1. Significant central and cortical atrophic changes   2. 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  No evidence of acute intracranial hemorrhage          Discussed care with family        Elin Petersen MD   4/8/2022 1:20 PM

## 2022-04-08 NOTE — PLAN OF CARE
Problem: Falls - Risk of:  Goal: Will remain free from falls  Description: Will remain free from falls  4/8/2022 1209 by Tania Hoyos RN  Outcome: Ongoing     Problem: Pain:  Goal: Pain level will decrease  Description: Pain level will decrease  Outcome: Ongoing

## 2022-04-08 NOTE — PROGRESS NOTES
Patient A&O to person, disoriented to place, time, and situation. VSS. Neuro checks unchanged, pt denies numbness and tingling. Pain managed with IV dilaudid. Patient anticipating OR this afternoon. Will continue to monitor.

## 2022-04-09 PROCEDURE — 2580000003 HC RX 258: Performed by: ORTHOPAEDIC SURGERY

## 2022-04-09 PROCEDURE — 97535 SELF CARE MNGMENT TRAINING: CPT

## 2022-04-09 PROCEDURE — 97530 THERAPEUTIC ACTIVITIES: CPT

## 2022-04-09 PROCEDURE — 6370000000 HC RX 637 (ALT 250 FOR IP): Performed by: ORTHOPAEDIC SURGERY

## 2022-04-09 PROCEDURE — 99024 POSTOP FOLLOW-UP VISIT: CPT | Performed by: ORTHOPAEDIC SURGERY

## 2022-04-09 PROCEDURE — 97166 OT EVAL MOD COMPLEX 45 MIN: CPT

## 2022-04-09 PROCEDURE — 6360000002 HC RX W HCPCS: Performed by: ORTHOPAEDIC SURGERY

## 2022-04-09 PROCEDURE — 97116 GAIT TRAINING THERAPY: CPT

## 2022-04-09 PROCEDURE — 97110 THERAPEUTIC EXERCISES: CPT

## 2022-04-09 PROCEDURE — 1200000000 HC SEMI PRIVATE

## 2022-04-09 PROCEDURE — 97162 PT EVAL MOD COMPLEX 30 MIN: CPT

## 2022-04-09 RX ADMIN — OXYCODONE 10 MG: 5 TABLET ORAL at 05:44

## 2022-04-09 RX ADMIN — HYDROMORPHONE HYDROCHLORIDE 0.5 MG: 1 INJECTION, SOLUTION INTRAMUSCULAR; INTRAVENOUS; SUBCUTANEOUS at 20:22

## 2022-04-09 RX ADMIN — CEFAZOLIN SODIUM 2000 MG: 10 INJECTION, POWDER, FOR SOLUTION INTRAVENOUS at 05:35

## 2022-04-09 RX ADMIN — ACETAMINOPHEN 1000 MG: 500 TABLET ORAL at 05:44

## 2022-04-09 RX ADMIN — SODIUM CHLORIDE, PRESERVATIVE FREE 10 ML: 5 INJECTION INTRAVENOUS at 20:28

## 2022-04-09 RX ADMIN — SENNOSIDES AND DOCUSATE SODIUM 1 TABLET: 50; 8.6 TABLET ORAL at 10:07

## 2022-04-09 RX ADMIN — CEFAZOLIN SODIUM 2000 MG: 10 INJECTION, POWDER, FOR SOLUTION INTRAVENOUS at 15:15

## 2022-04-09 RX ADMIN — METOPROLOL SUCCINATE 25 MG: 25 TABLET, EXTENDED RELEASE ORAL at 10:06

## 2022-04-09 RX ADMIN — ISOSORBIDE MONONITRATE 30 MG: 30 TABLET, EXTENDED RELEASE ORAL at 10:07

## 2022-04-09 RX ADMIN — ENOXAPARIN SODIUM 30 MG: 100 INJECTION SUBCUTANEOUS at 20:22

## 2022-04-09 RX ADMIN — KETOROLAC TROMETHAMINE 15 MG: 30 INJECTION, SOLUTION INTRAMUSCULAR; INTRAVENOUS at 08:13

## 2022-04-09 RX ADMIN — OXYCODONE 10 MG: 5 TABLET ORAL at 18:47

## 2022-04-09 RX ADMIN — CETIRIZINE HYDROCHLORIDE 10 MG: 10 TABLET, FILM COATED ORAL at 10:07

## 2022-04-09 RX ADMIN — KETOROLAC TROMETHAMINE 15 MG: 30 INJECTION, SOLUTION INTRAMUSCULAR; INTRAVENOUS at 02:41

## 2022-04-09 RX ADMIN — KETOROLAC TROMETHAMINE 15 MG: 30 INJECTION, SOLUTION INTRAMUSCULAR; INTRAVENOUS at 13:13

## 2022-04-09 RX ADMIN — SENNOSIDES AND DOCUSATE SODIUM 1 TABLET: 50; 8.6 TABLET ORAL at 20:22

## 2022-04-09 RX ADMIN — ATORVASTATIN CALCIUM 20 MG: 20 TABLET, FILM COATED ORAL at 10:06

## 2022-04-09 RX ADMIN — ENOXAPARIN SODIUM 30 MG: 100 INJECTION SUBCUTANEOUS at 10:07

## 2022-04-09 ASSESSMENT — PAIN DESCRIPTION - ONSET
ONSET: GRADUAL
ONSET: ON-GOING
ONSET: ON-GOING

## 2022-04-09 ASSESSMENT — PAIN SCALES - GENERAL
PAINLEVEL_OUTOF10: 0
PAINLEVEL_OUTOF10: 8
PAINLEVEL_OUTOF10: 0
PAINLEVEL_OUTOF10: 7
PAINLEVEL_OUTOF10: 7
PAINLEVEL_OUTOF10: 5
PAINLEVEL_OUTOF10: 0
PAINLEVEL_OUTOF10: 7

## 2022-04-09 ASSESSMENT — PAIN DESCRIPTION - ORIENTATION
ORIENTATION: LEFT

## 2022-04-09 ASSESSMENT — PAIN DESCRIPTION - PROGRESSION
CLINICAL_PROGRESSION: NOT CHANGED
CLINICAL_PROGRESSION: NOT CHANGED
CLINICAL_PROGRESSION: GRADUALLY WORSENING

## 2022-04-09 ASSESSMENT — PAIN DESCRIPTION - FREQUENCY
FREQUENCY: INTERMITTENT
FREQUENCY: CONTINUOUS
FREQUENCY: INTERMITTENT

## 2022-04-09 ASSESSMENT — PAIN - FUNCTIONAL ASSESSMENT
PAIN_FUNCTIONAL_ASSESSMENT: PREVENTS OR INTERFERES SOME ACTIVE ACTIVITIES AND ADLS

## 2022-04-09 ASSESSMENT — PAIN SCALES - WONG BAKER: WONGBAKER_NUMERICALRESPONSE: 6

## 2022-04-09 ASSESSMENT — PAIN DESCRIPTION - DESCRIPTORS
DESCRIPTORS: ACHING
DESCRIPTORS: ACHING
DESCRIPTORS: SORE

## 2022-04-09 ASSESSMENT — PAIN DESCRIPTION - LOCATION
LOCATION: HIP

## 2022-04-09 ASSESSMENT — PAIN DESCRIPTION - PAIN TYPE
TYPE: SURGICAL PAIN

## 2022-04-09 NOTE — PROGRESS NOTES
Patient arrived with granddaughter, A&O x 3. She c/o pain with movement, vital signs stable, pt. shows no signs of distress.

## 2022-04-09 NOTE — PROGRESS NOTES
Hospitalist Progress Note      PCP: Lyssa Coughlin MD    Date of Admission: 4/7/2022    Chief Complaint:     Chief Complaint   Patient presents with    Hip Pain     Pt fell from standing, witnessed fall, no LOC (LEFT HIP)       Subjective:  Patient seen and examined at the bedside. No complaints at this time.   No acute events overnight  The OR with orthopedic surgery yesterday  Slightly groggy this morning due to pain medication  Has been working with therapy  Social work to discuss placement with family    PFHS: reviewed as documented 4/7/2022, no changes unless noted above    Medications:  Reviewed    Infusion Medications    lactated ringers Stopped (04/08/22 1500)    sodium chloride       Scheduled Medications    ceFAZolin  2,000 mg IntraVENous Q8H    enoxaparin  30 mg SubCUTAneous BID    acetaminophen  1,000 mg Oral 3 times per day    ketorolac  15 mg IntraVENous Q6H    atorvastatin  20 mg Oral Daily    fluticasone  1 spray Each Nostril Daily    isosorbide mononitrate  30 mg Oral Daily    metoprolol succinate  25 mg Oral Daily    cetirizine  10 mg Oral Daily    sodium chloride flush  10 mL IntraVENous 2 times per day    sennosides-docusate sodium  1 tablet Oral BID     PRN Meds: oxyCODONE, oxyCODONE, traMADol, sodium chloride flush, sodium chloride, promethazine **OR** ondansetron, polyethylene glycol, HYDROmorphone **OR** HYDROmorphone      Intake/Output Summary (Last 24 hours) at 4/9/2022 0913  Last data filed at 4/9/2022 0807  Gross per 24 hour   Intake 1050 ml   Output 200 ml   Net 850 ml       Physical Exam    /87   Pulse 68   Temp 97.4 °F (36.3 °C) (Oral)   Resp 16   Ht 5' 2\" (1.575 m)   Wt 128 lb 12.8 oz (58.4 kg)   SpO2 96%   BMI 23.56 kg/m²     General appearance:  No acute distress, appears stated age  Eyes: Pupils equal, round, reactive to light, conjunctiva/corneas clear  Ears/Nose/Mouth/Throat: No external lesions or scars, hearing intact to voice  Neck: Trachea midline, no masses noted, no thyromegaly  Respiratory:  Non-labored breathing, clear to auscultation bilaterally  Cardiovascular: Regular rate and rhythm, no murmurs, gallops, or rubs  Abdomen: soft, non-tender, non-distended  Musculoskeletal: Warm, well perfused, no cyanosis or edema  Skin: normal color, no wounds noted  Psychiatric: Awake, alert, interactive, baseline mental status, no focal neuro deficits noted    Labs:   Recent Labs     04/07/22  1535 04/08/22  0600   WBC 7.6 10.6   HGB 13.6 13.2   HCT 40.3 38.8    201     Recent Labs     04/07/22  1535 04/08/22  0600    139   K 4.2 4.4    107   CO2 21 20*   BUN 15 20   CREATININE 0.7 0.6   CALCIUM 9.4 9.5     No results for input(s): AST, ALT, BILIDIR, BILITOT, ALKPHOS in the last 72 hours. Recent Labs     04/07/22  1534   INR 1.04     Recent Labs     04/07/22  1535   TROPONINI <0.01       Urinalysis:      Lab Results   Component Value Date    NITRU Negative 04/07/2022    WBCUA 0-2 04/07/2022    BACTERIA 2+ 04/07/2022    RBCUA None seen 04/07/2022    BLOODU Negative 04/07/2022    SPECGRAV >=1.030 04/07/2022    GLUCOSEU Negative 04/07/2022       Radiology:  XR HIP 2-3 VW W PELVIS LEFT   Final Result      1. Left hip replacement      XR CHEST PORTABLE   Final Result   1. No evidence of acute cardiopulmonary disease. XR PELVIS (1-2 VIEWS)   Final Result      Acute left femoral neck fracture. LEFT FEMUR 2 VIEWS      INDICATION: Fall. Fracture. FINDINGS: AP and lateral views left femur were obtained. Note is again made of the left femoral neck fracture. No other fractures are identified. No dislocation is identified. IMPRESSION:   1. Acute left femoral neck fracture. XR FEMUR LEFT (MIN 2 VIEWS)   Final Result      Acute left femoral neck fracture. LEFT FEMUR 2 VIEWS      INDICATION: Fall. Fracture. FINDINGS: AP and lateral views left femur were obtained. Note is again made of the left femoral neck fracture.  No other fractures are identified. No dislocation is identified. IMPRESSION:   1. Acute left femoral neck fracture. CT Head WO Contrast   Final Result   1. Significant central and cortical atrophic changes   2. 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 No evidence of acute intracranial hemorrhage          Assessment/Plan:    Active Hospital Problems    Diagnosis Date Noted    Closed displaced fracture of left femoral neck ([facility] [address] [S72.002A] 04/07/2022       Plan:    #Traumatic left femoral neck fracture  Further work-up required  Due to mechanical fall  Status post OR for repair 4/8  Analgesia as needed including IV Dilaudid  PT/OT, CM, may need placement  Subcu Lovenox    #History of dementia  Delirium precautions    #History of CAD, stable    # Remainder of medical conditions  -home management except as above    DVT Prophylaxis: Lovenox  Diet: ADULT DIET;  Regular  Code Status: Full Code    PT/OT Eval Status: Ongoing    Dispo: Dany Jensen pending clinical improvement    Crystal Landers MD

## 2022-04-09 NOTE — PROGRESS NOTES
Physical Therapy    Facility/Department: [facility] 5T ORTHO/NEURO  Initial Assessment / treatment    NAME: Ingrid Mijares  : 1935  MRN: [id number]    Date of Service: 2022    Discharge Recommendations: Ingrid Mijares scored a 10/24 on the AM-PAC short mobility form. Current research shows that an AM-PAC score of 17 or less is typically not associated with a discharge to the patient's home setting. Based on the patient's AM-PAC score and their current functional mobility deficits, it is recommended that the patient have 3-5 sessions per week of Physical Therapy at d/c to increase the patient's independence. Please see assessment section for further patient specific details. If patient discharges prior to next session this note will serve as a discharge summary. Please see below for the latest assessment towards goals. PT Equipment Recommendations  Equipment Needed: No  Other: defer to next level of care    Assessment   Body structures, Functions, Activity limitations: Decreased functional mobility   Assessment: Pt is 81 y.o. female admit with L hip fx, now POD #1 s/p L hip hemiarthroplasty. Pt requires 2 person assist for transfers. Max cues and assist with transfers and amb due to cognition. Amb distance limited due to pain and cognition. Will need further IP PT to maximize safety and independence. Will follow. Treatment Diagnosis: impaired gait and transfers  Prognosis: Good  Decision Making: Medium Complexity  PT Education: PT Role;Functional Mobility Training  Patient Education: hip precautions; WBAT L LE; pt demos partial understanding; rec reinforcement. REQUIRES PT FOLLOW UP: Yes       Patient Diagnosis(es): The encounter diagnosis was Closed displaced fracture of left femoral neck ([facility] [address] has no past medical history on file. has no past surgical history on file.     Restrictions  Position Activity Restriction  Other position/activity restrictions: up as tolerated , WBAT LLE, hip precautions - orders do not specify anterior vs posterior  Vision/Hearing  Vision: Within Functional Limits  Hearing: Within functional limits     Subjective  General  Chart Reviewed: Yes  Additional Pertinent Hx: Admit 4/7 s/p fall with c/o hip pain; (+) L femoral neck fx; to OR 4/8 for L hip hemiarthroplasty; PMHx: dementia  Referring Practitioner: Smooth Fraser MD  Diagnosis: L femoral neck fx  Subjective  Subjective: Pt found supine in bed. Agreeable to PT.  and dtr present during session. Pt indicates severe L hip pain with mobility in bed and amb - RN notified and provided IV meds. Pain is much improved by end of session. States \"What pain? \" after mobility. RN aware. Pain Screening  Patient Currently in Pain: Denies          Orientation  Orientation  Overall Orientation Status: Impaired (oriented to self; needs frequent cues for orientation to situation due to decreased cognition)  Social/Functional History  Social/Functional History  Lives With: Spouse  Type of Home: Assisted living  Home Layout: One level  Home Access: Level entry,Elevator  Bathroom Shower/Tub: Walk-in shower  Bathroom Toilet: Handicap height  Bathroom Equipment: Grab bars in shower,Grab bars around toilet,Built-in shower seat  Home Equipment: Rolling walker  Receives Help From: Family  ADL Assistance: Needs assistance (needs assist in/out of shower / supervision)  Homemaking Assistance: Needs assistance (staff performs)  Homemaking Responsibilities: No  Cognition        Objective          AROM RLE (degrees)  RLE AROM: WFL  AROM LLE (degrees)  LLE AROM : WFL  LLE General AROM: slow and effortful due to pain but [facility]  Strength RLE  Strength RLE: WFL  Strength LLE  Comment: at least 3+/5; not formally tested due to pain; needs min A to move LE in bed        Bed mobility  Supine to Sit: Moderate assistance  Scooting: Minimal assistance  Transfers  Sit to Stand:  Moderate Assistance;2 Person Assistance  Stand to sit: Moderate Assistance;2 Person Assistance  Comment: max cues and assist for hand placement with transfers  Ambulation  Ambulation?: Yes  Ambulation 1  Device: [address]: Moderate assistance  Quality of Gait: slow and effortful, max cues for each step and walker management, needs encouragement, unsteady, small steps  Distance: 10 ft     Balance  Sitting - Static: Good  Sitting - Dynamic: Good  Standing - Static: Fair  Standing - Dynamic: Fair  Exercises  Comments: x 10 B APs; x 5 B heel slides and L hip ABD with min A on L   Treatment included LE ex, gait and transfer training, pt education.   Plan   Plan  Times per week: 5-7  Current Treatment Recommendations: Balance Training,Functional Mobility Training,Transfer Training,Gait Training,Patient/Caregiver Education & Training,Home Exercise Program,Strengthening,Equipment Evaluation, Education, & procurement  Safety Devices  Type of devices: Call light within reach,Chair alarm in place,Nurse notified,Gait belt,Left in chair    G-Code       OutComes Score                                                  AM-PAC Score  AM-PAC Inpatient Mobility Raw Score : 10 (04/09/22 1006)  AM-PAC Inpatient T-Scale Score : 32.29 (04/09/22 1006)  Mobility Inpatient CMS 0-100% Score: 76.75 (04/09/22 1006)  Mobility Inpatient CMS G-Code Modifier : CL (04/09/22 1006)          Goals  Short term goals  Time Frame for Short term goals: discharge  Short term goal 1: Pt will transfer supine <--> sit with SBA  Short term goal 2: Pt will transfer sit <--> stand with min A  Short term goal 3: Pt will amb 40 ft with RW and min A  Short term goal 4: Pt will perform 10 reps L LE ex per protocol  Patient Goals   Patient goals : eventually return home with        Therapy Time   Individual Concurrent Group Co-treatment   Time In 0857         Time Out 0950         Minutes 53                 Timed Code Treatment Minutes:  38    Total Treatment Minutes:  53    If patient is discharged prior to next treatment, this note will serve as the discharge summary.   Sacha Ngo, PT, DPT  [id number]

## 2022-04-09 NOTE — PLAN OF CARE
Problem: Falls - Risk of:  Goal: Will remain free from falls  Description: Will remain free from falls  Outcome: Ongoing  Note: Fall precautions in place. Non-skid socks on. Bed locked in lowest position with alarm on and 3/4 side rails up. Bedside table, belongings, and call light within reach. Patient calling out appropriately when needing assistance. Hourly rounding in anticipation of patient needs. Floor clean and free from clutter. Room door open. Will continue to monitor. Problem: Pain:  Goal: Control of acute pain  Description: Control of acute pain  Outcome: Ongoing  Note: Denies pain when laying still. Endorses pain to left hip with movement. Numerical pain scale used to rate pain. Scheduled toradol administered. PRN medication available as well and patient made aware. Ice bags applied to left hip PRN. Assisted with repositioning in bed for comfort. Notified to let RN now if pain increases and additional medication is wanted. Will continue to monitor and reassess.

## 2022-04-09 NOTE — PLAN OF CARE
Problem: Falls - Risk of:  Goal: Will remain free from falls  Description: Will remain free from falls  4/8/2022 2024 by Inge Blanco RN  Outcome: Ongoing  4/8/2022 1209 by Cecelia Francis RN  Outcome: Ongoing  Goal: Absence of physical injury  Description: Absence of physical injury  Outcome: Ongoing     Problem: Skin Integrity:  Goal: Will show no infection signs and symptoms  Description: Will show no infection signs and symptoms  Outcome: Ongoing  Goal: Absence of new skin breakdown  Description: Absence of new skin breakdown  Outcome: Ongoing     Problem: Pain:  Goal: Pain level will decrease  Description: Pain level will decrease  4/8/2022 2024 by Inge Blanco RN  Outcome: Ongoing  4/8/2022 1209 by eCcelia Francis RN  Outcome: Ongoing  Goal: Control of acute pain  Description: Control of acute pain  Outcome: Ongoing  Goal: Control of chronic pain  Description: Control of chronic pain  Outcome: Ongoing

## 2022-04-09 NOTE — PROGRESS NOTES
Patient is alert and oriented with intermittent confusion. Vital signs stable. Pain to left hip only with movement and medicated with scheduled Toradol. Weak left dorsi/plantar flexion with +2 pedal pulse. Surgical site to left hip covered with mepilex, CDI. Tolerating diet well, denies n/v. Voiding without complications. Full weight bearing to LLE. Ambulating x2 assist with stedy. Fall precautions in place. Bed locked in lowest position with alarm on. Call light within reach and patient using appropriately. Will continue to monitor.

## 2022-04-09 NOTE — PROGRESS NOTES
Orthopedic Progress Note    Subjective:  Patient sitting up in a chair comfortably; no complaints    Objective:  AAOx3  Left hip dressing clean, dry, intact  Mild left thigh swelling  LLE NV intact L1-S1 with motor/sensory function intact  +LLE palpable pedal pulses  Left thigh/leg compartments soft/compressible    A/P: POD#1 s/p left hip hemiarthroplasty  1. WBAT  2. PT/OT/OOB w/ assistance  3. Pain control; minimize narcotic pain meds  4. DVT prophylaxis; recommend lovenox or subq heparin for a total of 4 weeks post op  5. Medical management  6.  Discharge planning

## 2022-04-09 NOTE — PROGRESS NOTES
Occupational Therapy   Occupational Therapy Initial Assessment and Treatment Note   Date: 2022   Patient Name: Esthela Thornton  MRN: [id number]     : 1935    Date of Service: 2022    Discharge Recommendations: Esthela Thornton scored a 15/24 on the AM-PAC ADL Inpatient form. Current research shows that an AM-PAC score of 17 or less is typically not associated with a discharge to the patient's home setting. Based on the patient's AM-PAC score and their current ADL deficits, it is recommended that the patient have 3-5 sessions per week of Occupational Therapy at d/c to increase the patient's independence. Please see assessment section for further patient specific details. If patient discharges prior to next session this note will serve as a discharge summary. Please see below for the latest assessment towards goals. OT Equipment Recommendations  Equipment Needed: No  Other: defer to next care facility    Assessment   Performance deficits / Impairments: Decreased functional mobility ; Decreased endurance;Decreased ADL status  Assessment: Pt from home with spouse in [address] Pt has dementia at baseline and needs freq cues / ongoing reassurance throughout session about things. Pt demo functioning well below baseline level for ADLs / functional mobility. Pt would benefit from ongoing inpt OT at [place]. Will follow as inpt  Treatment Diagnosis: impaired ADLs /functional mobility 2/2 L hip fx s/p repair  Prognosis: Good  Decision Making: Medium Complexity  OT Education: Plan of Care;OT Role  Patient Education: No learning / no carryover noted - reteach as tolerates  REQUIRES OT FOLLOW UP: Yes  Activity Tolerance  Activity Tolerance: Patient limited by fatigue;Patient limited by pain; Patient Tolerated treatment well  Activity Tolerance: no SAUCEDA noted. Pt fearul and anxious with mobility attempts  Safety Devices  Safety Devices in place: Yes  Type of devices: Call light within reach; Chair alarm in place;Nurse notified; Left in chair           Patient Diagnosis(es): The encounter diagnosis was Closed displaced fracture of left femoral neck ([address] has no past medical history on file. has no past surgical history on file. Treatment Diagnosis: impaired ADLs /functional mobility 2/2 L hip fx s/p repair      Restrictions  Position Activity Restriction  Other position/activity restrictions: up as tolerated , WBAT LLE, hip precautions - orders do not specify anterior vs posterior    Subjective   General  Chart Reviewed: Yes  Additional Pertinent Hx: Admit 4/7 with fall + L femoral neck fracture on scans      ortho consult to OR 4/8 s/p L Hemiarthroplasty                                 PMHX: dementia, CAD,  Family / Caregiver Present: Yes (spouse / daughter)  Diagnosis: fall , L femoral neck fx  Subjective  Subjective: \" I need to see almaz \"  Pt found resting in bed agreeable for OOB/ OT eval and tx    Pain Assessment  Pain Level: Yes - L hip unable to rate - RN aware. Pt received medication during tx. Social/Functional History  Social/Functional History  Lives With: Spouse  Type of Home: Assisted living  Home Layout: One level  Home Access: Level entry,Elevator  Bathroom Shower/Tub: Walk-in shower  Bathroom Toilet: Handicap height  Bathroom Equipment: Grab bars in shower,Grab bars around toilet,Built-in shower seat  Home Equipment: Rolling walker  Receives Help From: Family  ADL Assistance: Needs assistance (needs assist in/out of shower / supervision)  Homemaking Assistance: Needs assistance (staff performs)  Homemaking Responsibilities: No       Objective  Treatment included functional transfer training, ADL's and pt. education.     Vision: Within Functional Limits  Hearing: Within functional limits    Orientation  Overall Orientation Status: Impaired  Orientation Level: Oriented to person;Disoriented to situation;Disoriented to time;Disoriented to place     Balance  Sitting Balance: Minimal assistance (improved to CGA at EOB. Pt with R sided lean to offload sore L hip)  Standing Balance: Minimal assistance (with walker)  Standing Balance  Time: 5 mins x 1 and 1 mins x 1  Activity: functional transfers / stance with walker / functional mobility w/ walker  Functional Mobility  Functional - Mobility Device: Rolling Walker  Activity: Other (3-4 x 2)  Assist Level: Moderate assistance  Functional Mobility Comments: needing heavy cues / steering assist.  Toilet Transfers  Toilet - Technique: Ambulating  Equipment Used: Standard bedside commode  Toilet Transfer: Moderate assistance;2 Person assistance  Toilet Transfers Comments: simulated  - cues for tech / hand placement  ADL  Feeding: Setup;Verbal cueing  Grooming: Setup;Verbal cueing  LE Dressing: Maximum assistance;Dependent/Total  Toileting: Maximum assistance;Dependent/Total (simulated)  Additional Comments: Pt needing freq cues / reminders to stay on task. Tone RUE  RUE Tone: Normotonic  Tone LUE  LUE Tone: Normotonic  Coordination  Movements Are Fluid And Coordinated: Yes        Transfers  Sit to stand: Moderate assistance;2 Person assistance;Dependent/Total  Stand to sit: Dependent/Total;Moderate assistance;2 Person assistance  Transfer Comments: from bed - pt unable to follow directives for hand placement  Vision - Basic Assessment  Prior Vision: No visual deficits  Cognition  Overall Cognitive Status: Exceptions  Arousal/Alertness: Appropriate responses to stimuli  Following Commands:  Follows one step commands with repetition  Attention Span: Difficulty attending to directions  Memory: Decreased short term memory;Decreased recall of recent events;Decreased recall of precautions;Decreased recall of biographical Information  Safety Judgement: Decreased awareness of need for safety  Insights: Not aware of deficits  Initiation: Requires cues for all  Sequencing: Requires cues for some  Cognition Comment: Pt with moderated dementia - needing ongoing cues for tasks/ initiation    LUE AROM (degrees)  LUE AROM : WFL  Left Hand AROM (degrees)  Left Hand AROM: WFL  RUE AROM (degrees)  RUE AROM : WFL  Right Hand AROM (degrees)  Right Hand AROM: WFL  LUE Strength  Gross LUE Strength: WFL  L Hand General: 4/5  RUE Strength  Gross RUE Strength: WFL  R Hand General: 4/5     Hand Dominance  Hand Dominance: Right        Plan   Plan  Times per week: 5-7x  Times per day: Daily  Current Treatment Recommendations: Endurance Training,Functional Mobility Training,Safety Education & Training,Self-Care / ADL,Equipment Evaluation, Education, & procurement,Patient/Caregiver Education & Training    AM-PAC Score  AM-[facility] Inpatient Daily Activity Raw Score: 15 (04/09/22 1011)  AM-PAC Inpatient ADL T-Scale Score : 34.69 (04/09/22 1011)  ADL Inpatient CMS 0-100% Score: 56.46 (04/09/22 1011)  ADL Inpatient CMS G-Code Modifier : CK (04/09/22 1011)    Goals  Short term goals  Time Frame for Short term goals: at d/c  Short term goal 1: Stance x 7 mins with SBA for ADLs /IADLs  Short term goal 2: LE Dressing with Min A and AE prn  Short term goal 3: Commode transfers with CGA  Short term goal 4: Pt /family verb hip precautions s/p L tay arthoplasty     Therapy Time   Individual Concurrent Group Co-treatment   Time In 0857         Time Out 0950         Minutes 53              Timed Code Treatment Minutes:   38 mins     Total Treatment Minutes:  53 mins       Az Jaimes OT

## 2022-04-10 PROCEDURE — 6370000000 HC RX 637 (ALT 250 FOR IP): Performed by: ORTHOPAEDIC SURGERY

## 2022-04-10 PROCEDURE — 2580000003 HC RX 258: Performed by: ORTHOPAEDIC SURGERY

## 2022-04-10 PROCEDURE — 6360000002 HC RX W HCPCS: Performed by: ORTHOPAEDIC SURGERY

## 2022-04-10 PROCEDURE — 2580000003 HC RX 258: Performed by: INTERNAL MEDICINE

## 2022-04-10 PROCEDURE — 1200000000 HC SEMI PRIVATE

## 2022-04-10 RX ADMIN — ISOSORBIDE MONONITRATE 30 MG: 30 TABLET, EXTENDED RELEASE ORAL at 08:47

## 2022-04-10 RX ADMIN — ACETAMINOPHEN 1000 MG: 500 TABLET ORAL at 13:27

## 2022-04-10 RX ADMIN — ACETAMINOPHEN 1000 MG: 500 TABLET ORAL at 20:47

## 2022-04-10 RX ADMIN — OXYCODONE 10 MG: 5 TABLET ORAL at 06:22

## 2022-04-10 RX ADMIN — ENOXAPARIN SODIUM 30 MG: 100 INJECTION SUBCUTANEOUS at 20:47

## 2022-04-10 RX ADMIN — ENOXAPARIN SODIUM 30 MG: 100 INJECTION SUBCUTANEOUS at 08:47

## 2022-04-10 RX ADMIN — METOPROLOL SUCCINATE 25 MG: 25 TABLET, EXTENDED RELEASE ORAL at 08:47

## 2022-04-10 RX ADMIN — OXYCODONE 10 MG: 5 TABLET ORAL at 13:27

## 2022-04-10 RX ADMIN — ACETAMINOPHEN 1000 MG: 500 TABLET ORAL at 06:23

## 2022-04-10 RX ADMIN — SENNOSIDES AND DOCUSATE SODIUM 1 TABLET: 50; 8.6 TABLET ORAL at 20:47

## 2022-04-10 RX ADMIN — CETIRIZINE HYDROCHLORIDE 10 MG: 10 TABLET, FILM COATED ORAL at 08:47

## 2022-04-10 RX ADMIN — ATORVASTATIN CALCIUM 20 MG: 20 TABLET, FILM COATED ORAL at 08:47

## 2022-04-10 RX ADMIN — SODIUM CHLORIDE, POTASSIUM CHLORIDE, SODIUM LACTATE AND CALCIUM CHLORIDE: 600; 310; 30; 20 INJECTION, SOLUTION INTRAVENOUS at 23:50

## 2022-04-10 RX ADMIN — TRAMADOL HYDROCHLORIDE 50 MG: 50 TABLET, COATED ORAL at 09:47

## 2022-04-10 RX ADMIN — SODIUM CHLORIDE, PRESERVATIVE FREE 10 ML: 5 INJECTION INTRAVENOUS at 08:47

## 2022-04-10 RX ADMIN — SENNOSIDES AND DOCUSATE SODIUM 1 TABLET: 50; 8.6 TABLET ORAL at 08:47

## 2022-04-10 ASSESSMENT — PAIN DESCRIPTION - LOCATION
LOCATION: HIP;LEG
LOCATION: HIP
LOCATION: HIP

## 2022-04-10 ASSESSMENT — PAIN SCALES - GENERAL
PAINLEVEL_OUTOF10: 0
PAINLEVEL_OUTOF10: 7
PAINLEVEL_OUTOF10: 0
PAINLEVEL_OUTOF10: 4
PAINLEVEL_OUTOF10: 4
PAINLEVEL_OUTOF10: 7

## 2022-04-10 ASSESSMENT — PAIN DESCRIPTION - DESCRIPTORS
DESCRIPTORS: ACHING

## 2022-04-10 ASSESSMENT — PAIN DESCRIPTION - FREQUENCY
FREQUENCY: CONTINUOUS

## 2022-04-10 ASSESSMENT — PAIN DESCRIPTION - ORIENTATION
ORIENTATION: LEFT;POSTERIOR
ORIENTATION: LEFT
ORIENTATION: LEFT

## 2022-04-10 ASSESSMENT — PAIN - FUNCTIONAL ASSESSMENT
PAIN_FUNCTIONAL_ASSESSMENT: PREVENTS OR INTERFERES SOME ACTIVE ACTIVITIES AND ADLS

## 2022-04-10 ASSESSMENT — PAIN DESCRIPTION - PROGRESSION
CLINICAL_PROGRESSION: NOT CHANGED
CLINICAL_PROGRESSION: GRADUALLY WORSENING

## 2022-04-10 ASSESSMENT — PAIN DESCRIPTION - PAIN TYPE
TYPE: SURGICAL PAIN
TYPE: SURGICAL PAIN;ACUTE PAIN
TYPE: ACUTE PAIN;SURGICAL PAIN

## 2022-04-10 ASSESSMENT — PAIN DESCRIPTION - ONSET
ONSET: ON-GOING

## 2022-04-10 NOTE — PROGRESS NOTES
Hospitalist Progress Note      PCP: Jose Guzman MD    Date of Admission: 4/7/2022    Chief Complaint:     Chief Complaint   Patient presents with    Hip Pain     Pt fell from standing, witnessed fall, no LOC (LEFT HIP)       Subjective:  Patient seen and examined at the bedside. No complaints at this time. No acute events overnight  Doing well postoperatively  Pain is well controlled, no Dilaudid needed since midnight, doing well on p.o.   Scored 10/15 with PT/OT, will need placement  No changes to plan, await placement    PFHS: reviewed as documented 4/7/2022, no changes unless noted above    Medications:  Reviewed    Infusion Medications    lactated ringers Stopped (04/08/22 1500)    sodium chloride       Scheduled Medications    enoxaparin  30 mg SubCUTAneous BID    acetaminophen  1,000 mg Oral 3 times per day    atorvastatin  20 mg Oral Daily    fluticasone  1 spray Each Nostril Daily    isosorbide mononitrate  30 mg Oral Daily    metoprolol succinate  25 mg Oral Daily    cetirizine  10 mg Oral Daily    sodium chloride flush  10 mL IntraVENous 2 times per day    sennosides-docusate sodium  1 tablet Oral BID     PRN Meds: oxyCODONE, oxyCODONE, traMADol, sodium chloride flush, sodium chloride, promethazine **OR** ondansetron, polyethylene glycol, HYDROmorphone **OR** HYDROmorphone      Intake/Output Summary (Last 24 hours) at 4/10/2022 0928  Last data filed at 4/10/2022 0847  Gross per 24 hour   Intake 180 ml   Output 0 ml   Net 180 ml       Physical Exam    /73   Pulse 80   Temp 98.8 °F (37.1 °C) (Oral)   Resp 16   Ht 5' 2\" (1.575 m)   Wt 128 lb 12.8 oz (58.4 kg)   SpO2 92%   BMI 23.56 kg/m²     General appearance:  No acute distress, appears stated age  Eyes: Pupils equal, round, reactive to light, conjunctiva/corneas clear  Ears/Nose/Mouth/Throat: No external lesions or scars, hearing intact to voice  Neck: Trachea midline, no masses noted, no thyromegaly  Respiratory: Non-labored breathing, clear to auscultation bilaterally  Cardiovascular: Regular rate and rhythm, no murmurs, gallops, or rubs  Abdomen: soft, non-tender, non-distended  Musculoskeletal: Warm, well perfused, no cyanosis or edema  Skin: normal color, no wounds noted  Psychiatric: Awake, alert, interactive, baseline mental status, no focal neuro deficits noted    Labs:   Recent Labs     04/07/22  1535 04/08/22  0600   WBC 7.6 10.6   HGB 13.6 13.2   HCT 40.3 38.8    201     Recent Labs     04/07/22  1535 04/08/22  0600    139   K 4.2 4.4    107   CO2 21 20*   BUN 15 20   CREATININE 0.7 0.6   CALCIUM 9.4 9.5     No results for input(s): AST, ALT, BILIDIR, BILITOT, ALKPHOS in the last 72 hours. Recent Labs     04/07/22  1534   INR 1.04     Recent Labs     04/07/22  1535   TROPONINI <0.01       Urinalysis:      Lab Results   Component Value Date    NITRU Negative 04/07/2022    WBCUA 0-2 04/07/2022    BACTERIA 2+ 04/07/2022    RBCUA None seen 04/07/2022    BLOODU Negative 04/07/2022    SPECGRAV >=1.030 04/07/2022    GLUCOSEU Negative 04/07/2022       Radiology:  XR HIP 2-3 VW W PELVIS LEFT   Final Result      1. Left hip replacement      XR CHEST PORTABLE   Final Result   1. No evidence of acute cardiopulmonary disease. XR PELVIS (1-2 VIEWS)   Final Result      Acute left femoral neck fracture. LEFT FEMUR 2 VIEWS      INDICATION: Fall. Fracture. FINDINGS: AP and lateral views left femur were obtained. Note is again made of the left femoral neck fracture. No other fractures are identified. No dislocation is identified. IMPRESSION:   1. Acute left femoral neck fracture. XR FEMUR LEFT (MIN 2 VIEWS)   Final Result      Acute left femoral neck fracture. LEFT FEMUR 2 VIEWS      INDICATION: Fall. Fracture. FINDINGS: AP and lateral views left femur were obtained. Note is again made of the left femoral neck fracture. No other fractures are identified.  No dislocation is identified. IMPRESSION:   1. Acute left femoral neck fracture. CT Head WO Contrast   Final Result   1. Significant central and cortical atrophic changes   2. 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 No evidence of acute intracranial hemorrhage          Assessment/Plan:    Active Hospital Problems    Diagnosis Date Noted    Closed displaced fracture of left femoral neck ([facility] [address] [S72.002A] 04/07/2022       Plan:    #Traumatic left femoral neck fracture  Further work-up required  Due to mechanical fall  Status post OR for repair 4/8  Analgesia as needed, doing well on p.o. today  PT/OT scored 10/15, will need placement  Subcu Lovenox    #History of dementia  Delirium precautions    #History of CAD, stable  # Remainder of medical conditions  -home management except as above    DVT Prophylaxis: Lovenox  Diet: ADULT DIET;  Regular  Code Status: Full Code    PT/OT Eval Status: Ongoing    Dispo: [facility] pending clinical improvement    Katelyn Espinoza MD

## 2022-04-10 NOTE — PROGRESS NOTES
Pt. Urinated & was administered pericare and   her underwear (depend) was changed, also her linen was changed

## 2022-04-10 NOTE — PLAN OF CARE
Problem: Falls - Risk of:  Goal: Will remain free from falls  Description: Will remain free from falls  Outcome: Ongoing  Note: Ambulating x2 assist with gait belt and stedy. Fall precautions in place. Non-skid socks on. Bed locked in lowest position with alarm on and 3/4 side rails up. Bedside table, belongings, and call light within reach. Hourly rounding in anticipation of patient needs. Floor clean and free from clutter. Room door open. Will continue to monitor. Problem: Skin Integrity:  Goal: Will show no infection signs and symptoms  Description: Will show no infection signs and symptoms  Outcome: Ongoing  Note: Mepilex to left hip surgical site remains CDI. No drainage or odor noted. Patient is afebrile. Will continue to monitor. Problem: Pain:  Goal: Control of acute pain  Description: Control of acute pain  Outcome: Ongoing  Note: Denies left hip pain when laying still, endorses pain with movement. Pain to left hip controlled with PRN oxy and tramadol. Using numerical pain rating scale appropriately. Assisted with repositioning in bed for comfort. Notified of pain medication administration schedule. Will continue to monitor and reassess.

## 2022-04-10 NOTE — PROGRESS NOTES
Patient is alert and oriented with intermittent confusion, mainly at night. Vital signs stable. Weak left dorsi/plantar flexion. Mepilex to left hip surgical site remains CDI. Pain controlled with PRN oxy and tramadol. Tolerating diet well, denies n/v. Up x2-3 assist with gait belt and stedy. Voiding without complications. Fall precautions in place. Bed locked in lowest position with alarm on. Call light within reach. Will continue to monitor.

## 2022-04-11 PROCEDURE — 99024 POSTOP FOLLOW-UP VISIT: CPT | Performed by: ORTHOPAEDIC SURGERY

## 2022-04-11 PROCEDURE — 97535 SELF CARE MNGMENT TRAINING: CPT

## 2022-04-11 PROCEDURE — 2580000003 HC RX 258: Performed by: ORTHOPAEDIC SURGERY

## 2022-04-11 PROCEDURE — 1200000000 HC SEMI PRIVATE

## 2022-04-11 PROCEDURE — 97530 THERAPEUTIC ACTIVITIES: CPT

## 2022-04-11 PROCEDURE — 6360000002 HC RX W HCPCS: Performed by: ORTHOPAEDIC SURGERY

## 2022-04-11 PROCEDURE — 2580000003 HC RX 258: Performed by: INTERNAL MEDICINE

## 2022-04-11 PROCEDURE — 6370000000 HC RX 637 (ALT 250 FOR IP): Performed by: ORTHOPAEDIC SURGERY

## 2022-04-11 RX ADMIN — CETIRIZINE HYDROCHLORIDE 10 MG: 10 TABLET, FILM COATED ORAL at 10:17

## 2022-04-11 RX ADMIN — ACETAMINOPHEN 1000 MG: 500 TABLET ORAL at 21:35

## 2022-04-11 RX ADMIN — OXYCODONE 10 MG: 5 TABLET ORAL at 18:40

## 2022-04-11 RX ADMIN — METOPROLOL SUCCINATE 25 MG: 25 TABLET, EXTENDED RELEASE ORAL at 10:16

## 2022-04-11 RX ADMIN — OXYCODONE 10 MG: 5 TABLET ORAL at 11:49

## 2022-04-11 RX ADMIN — ENOXAPARIN SODIUM 30 MG: 100 INJECTION SUBCUTANEOUS at 21:38

## 2022-04-11 RX ADMIN — OXYCODONE 5 MG: 5 TABLET ORAL at 05:02

## 2022-04-11 RX ADMIN — SODIUM CHLORIDE, PRESERVATIVE FREE 10 ML: 5 INJECTION INTRAVENOUS at 10:17

## 2022-04-11 RX ADMIN — HYDROMORPHONE HYDROCHLORIDE 0.5 MG: 1 INJECTION, SOLUTION INTRAMUSCULAR; INTRAVENOUS; SUBCUTANEOUS at 02:23

## 2022-04-11 RX ADMIN — ENOXAPARIN SODIUM 30 MG: 100 INJECTION SUBCUTANEOUS at 10:17

## 2022-04-11 RX ADMIN — ACETAMINOPHEN 1000 MG: 500 TABLET ORAL at 14:47

## 2022-04-11 RX ADMIN — HYDROMORPHONE HYDROCHLORIDE 0.5 MG: 1 INJECTION, SOLUTION INTRAMUSCULAR; INTRAVENOUS; SUBCUTANEOUS at 09:07

## 2022-04-11 RX ADMIN — HYDROMORPHONE HYDROCHLORIDE 0.5 MG: 1 INJECTION, SOLUTION INTRAMUSCULAR; INTRAVENOUS; SUBCUTANEOUS at 13:59

## 2022-04-11 RX ADMIN — ISOSORBIDE MONONITRATE 30 MG: 30 TABLET, EXTENDED RELEASE ORAL at 10:16

## 2022-04-11 RX ADMIN — SENNOSIDES AND DOCUSATE SODIUM 1 TABLET: 50; 8.6 TABLET ORAL at 10:16

## 2022-04-11 RX ADMIN — HYDROMORPHONE HYDROCHLORIDE 0.5 MG: 1 INJECTION, SOLUTION INTRAMUSCULAR; INTRAVENOUS; SUBCUTANEOUS at 21:38

## 2022-04-11 RX ADMIN — SENNOSIDES AND DOCUSATE SODIUM 1 TABLET: 50; 8.6 TABLET ORAL at 21:35

## 2022-04-11 RX ADMIN — SODIUM CHLORIDE, POTASSIUM CHLORIDE, SODIUM LACTATE AND CALCIUM CHLORIDE: 600; 310; 30; 20 INJECTION, SOLUTION INTRAVENOUS at 21:41

## 2022-04-11 RX ADMIN — ATORVASTATIN CALCIUM 20 MG: 20 TABLET, FILM COATED ORAL at 10:16

## 2022-04-11 RX ADMIN — SODIUM CHLORIDE, POTASSIUM CHLORIDE, SODIUM LACTATE AND CALCIUM CHLORIDE: 600; 310; 30; 20 INJECTION, SOLUTION INTRAVENOUS at 09:57

## 2022-04-11 RX ADMIN — ACETAMINOPHEN 1000 MG: 500 TABLET ORAL at 05:01

## 2022-04-11 RX ADMIN — SODIUM CHLORIDE, PRESERVATIVE FREE 10 ML: 5 INJECTION INTRAVENOUS at 21:38

## 2022-04-11 ASSESSMENT — PAIN DESCRIPTION - PAIN TYPE
TYPE: ACUTE PAIN;SURGICAL PAIN
TYPE: ACUTE PAIN
TYPE: ACUTE PAIN

## 2022-04-11 ASSESSMENT — PAIN SCALES - GENERAL
PAINLEVEL_OUTOF10: 0
PAINLEVEL_OUTOF10: 5
PAINLEVEL_OUTOF10: 8
PAINLEVEL_OUTOF10: 0
PAINLEVEL_OUTOF10: 7
PAINLEVEL_OUTOF10: 0
PAINLEVEL_OUTOF10: 5
PAINLEVEL_OUTOF10: 0
PAINLEVEL_OUTOF10: 8
PAINLEVEL_OUTOF10: 0

## 2022-04-11 ASSESSMENT — PAIN DESCRIPTION - LOCATION
LOCATION: HIP

## 2022-04-11 ASSESSMENT — PAIN DESCRIPTION - DESCRIPTORS
DESCRIPTORS: ACHING

## 2022-04-11 ASSESSMENT — PAIN DESCRIPTION - FREQUENCY
FREQUENCY: CONTINUOUS

## 2022-04-11 ASSESSMENT — PAIN DESCRIPTION - PROGRESSION
CLINICAL_PROGRESSION: NOT CHANGED

## 2022-04-11 ASSESSMENT — PAIN SCALES - PAIN ASSESSMENT IN ADVANCED DEMENTIA (PAINAD)
FACIALEXPRESSION: 0
BREATHING: 0
BODYLANGUAGE: 0
NEGVOCALIZATION: 1
NEGVOCALIZATION: 0
CONSOLABILITY: 0
CONSOLABILITY: 1
FACIALEXPRESSION: 2
BREATHING: 0
TOTALSCORE: 0
TOTALSCORE: 5
BODYLANGUAGE: 1

## 2022-04-11 ASSESSMENT — PAIN - FUNCTIONAL ASSESSMENT
PAIN_FUNCTIONAL_ASSESSMENT: PREVENTS OR INTERFERES SOME ACTIVE ACTIVITIES AND ADLS

## 2022-04-11 ASSESSMENT — PAIN DESCRIPTION - ORIENTATION
ORIENTATION: LEFT

## 2022-04-11 ASSESSMENT — PAIN DESCRIPTION - ONSET
ONSET: ON-GOING
ONSET: ON-GOING

## 2022-04-11 NOTE — PROGRESS NOTES
Patient is A&O x4. Patient is anxious at times. RA, sat 93%. No complaints of SOB. Medicated for c/o left hip pain as needed. Respirations appear to easy and unlabored. Lungs clear. Respirations easy with no complaints of cough. No complaints of nausea/vomiting/diarrhea. Up with assist/steady x3 to the bathroom/BSC as needed. IVF infusing per right FA PIV as ordered. Left hip dressings intact. Pure wick placed for occasional incontinence. Tolerating regular diet. Plan of care and safety measures reviewed with the patient. Call light in reach and bed alarm in place. Will continue to monitor.   Electronically signed by Barbaraann Bumpers, RN on 4/10/2022 at 10:34 PM

## 2022-04-11 NOTE — PROGRESS NOTES
Physical Therapy  Facility/Department: [facility] 5T ORTHO/NEURO  Daily Treatment Note  NAME: Urbano Akers  : 1935  MRN: [id number]    Date of Service: 2022    Discharge Recommendations: Urbano Akers scored a 7/24 on the AM-PAC short mobility form. Current research shows that an AM-PAC score of 17 or less is typically not associated with a discharge to the patient's home setting. Based on the patient's AM-PAC score and their current functional mobility deficits, it is recommended that the patient have 3-5 sessions per week of Physical Therapy at d/c to increase the patient's independence. Please see assessment section for further patient specific details. If patient discharges prior to next session this note will serve as a discharge summary. Please see below for the latest assessment towards goals. PT Equipment Recommendations  Other: defer to next level of care    Assessment   Body structures, Functions, Activity limitations: Decreased functional mobility   Assessment: Pt requires increased assist for mobility today. Max A x 2 for trasnfers including stand pivot transfer. Unable to ambulate today due to pain, weakness, and cognition. Rec continued IP PT. Discussed with pt and family. Will follow. Treatment Diagnosis: impaired gait and transfers  Prognosis: Good  PT Education: PT Role;Functional Mobility Training  Patient Education: hip precautions; WBAT L LE; pt demos partial understanding; rec reinforcement. REQUIRES PT FOLLOW UP: Yes     Patient Diagnosis(es): The encounter diagnosis was Closed displaced fracture of left femoral neck ([facility] [address] has no past medical history on file. has a past surgical history that includes hip surgery (Left, 2022). Restrictions  Position Activity Restriction  Other position/activity restrictions: up as tolerated , WBAT LLE, hip precautions - orders do not specify anterior vs posterior  Subjective   General  Chart Reviewed:  Yes  Additional Pertinent Hx: Admit 4/7 s/p fall with c/o hip pain; (+) L femoral neck fx; to OR 4/8 for L hip hemiarthroplasty; PMHx: dementia  Referring Practitioner: Jin Morales MD  Subjective  Subjective: Pt found supine in bed. Agreeable to PT.  and granddaughter present during session. Pt indicates severe L hip pain with mobility in bed and amb, improved when resting in chair at end of session. RN provided IV pain meds prior to mobility. Orientation  Orientation  Overall Orientation Status: Impaired (oriented to self only)  Cognition      Objective   Bed mobility  Supine to Sit: Dependent/Total (min + mod A)  Scooting: Maximal assistance  Transfers  Sit to Stand: Maximum Assistance;2 Person Assistance  Stand to sit: 2 Person Assistance;Maximum Assistance  Stand Pivot Transfers: Maximum Assistance;2 Person Assistance (from EOB to [facility] and BSC to chair)        Balance  Sitting - Static: Fair  Sitting - Dynamic: Fair  Standing - Static: Poor  Standing - Dynamic: Poor  Comments: CGA to SBA provided for unsupported sitting. Max A x 2 for static and dynamic stance. Demos poor standing posture with flexed hips and trunk, posterior lean.             Comment: max v/c and physical assist needed to maintain hip precautions in sitting              G-Code     OutComes Score                                                     AM-PAC Score  AM-PAC Inpatient Mobility Raw Score : 7 (04/11/22 1000)  AM-PAC Inpatient T-Scale Score : 26.42 (04/11/22 1000)  Mobility Inpatient CMS 0-100% Score: 92.36 (04/11/22 1000)  Mobility Inpatient CMS G-Code Modifier : CM (04/11/22 1000)          Goals  Short term goals  Time Frame for Short term goals: discharge  Short term goal 1: Pt will transfer supine <--> sit with SBA  ongoing  Short term goal 2: Pt will transfer sit <--> stand with min A   ongoing  Short term goal 3: Pt will amb 40 ft with RW and min A   ongoing  Short term goal 4: Pt will perform 10 reps L LE ex per protocol ongoing  Patient Goals   Patient goals : eventually return home with     Plan    Plan  Times per week: 5-7  Current Treatment Recommendations: Balance Training,Functional Mobility Training,Transfer Training,Gait Training,Patient/Caregiver Education & Training,Home Exercise Program,Strengthening,Equipment Evaluation, Education, & procurement  Safety Devices  Type of devices: Call light within reach,Chair alarm in place,Nurse notified,Gait belt,Left in chair (LEs elevated, ice to L hip)     Therapy Time   Individual Concurrent Group Co-treatment   Time In 0907         Time Out 0952         Minutes 45                 Timed Code Treatment Minutes:  45    Total Treatment Minutes:  45    If patient is discharged prior to next treatment, this note will serve as the discharge summary.   Ramirez Mcmahon, PT, DPT  [id number]

## 2022-04-11 NOTE — CARE COORDINATION
Case Management Assessment           Initial Evaluation                Date / Time of Evaluation: 4/11/2022 11:29 AM                 Assessment Completed by: KUSUM Osorio, RAFFIW    Patient Name: Ally Colin     YOB: 1935  Diagnosis: Closed displaced fracture of left femoral neck ([address] William Boucher     Date / Time: 4/7/2022  2:50 PM    Patient Admission Status: Inpatient    If patient is discharged prior to next notation, then this note serves as note for discharge by case management. Current PCP: PROVIDER Danelle Shaw MD  Clinic Patient: No    Chart Reviewed: Yes  Patient/ Family Interviewed: Yes    Initial assessment completed at bedside with: pt and pt's family    Hospitalization in the last 30 days: No    Emergency Contacts:  No emergency contact information on file. Advance Directives:   Code Status: Full Code    Healthcare Power of : No    Financial  Payor: Janae Tripper / Plan: [facility] PPO / Product Type: Medicare /     Pre-cert required for SNF: Yes    Pharmacy    [place] [phone] [facility] [phone]  [place] [id number]  Phone: [phone] Fax: [phone]      Potential assistance Purchasing Medications: Potential Assistance Purchasing Medications: No  Does Patient want to participate in local refill/ meds to beds program?:      Meds To Beds General Rules:  1. Can ONLY be done Monday- Friday between 8:30am-5pm  2. Prescription(s) must be in pharmacy by 3pm to be filled same day  3. Copy of patient's insurance/ prescription drug card and patient face sheet must be sent along with the prescription(s)  4. Cost of Rx cannot be added to hospital bill. If financial assistance is needed, please contact unit  or ;  or  CANNOT provide pharmacy voucher for patients co-pays  5.  Patients can then  the prescription on their way out of the hospital at discharge, or pharmacy can deliver to the bedside if staff is available. (payment due at time of pick-up or delivery - cash, check, or card accepted)     Able to afford home medications/ co-pay costs: Yes    ADLS  Support Systems: Family Members,Spouse/Significant Other    PT AM-PAC: 7 /24  OT AM-PAC: 14 /24    [place]: home w/spouse  Steps: yes    Plans to RETURN to current housing: No  Barriers to RETURNING to current housing: medical stability, SNF     Home Care Information  Currently ACTIVE with [employer] [address]: No    Currently ACTIVE with [facility]: No    Durable Medical Equipment  DME Provider: none  Equipment: none    Home Oxygen and [address] prior to admission: No    Dialysis  Active with HD/PD prior to admission: No    DISCHARGE PLAN:  Disposition: [place] (SNF): TBD Phone: TBD  Fax: TBD    Transportation PLAN for discharge: EMS transportation     Factors facilitating achievement of predicted outcomes: Family support    Barriers to discharge: Confusion, Limited safety awareness and Medical complications    Additional Case Management Notes:     SW met w/Pt, Pt's ,daughter and granddaughter at bedside. Pt is from home w/her . Pt's family is wanting a SNF placement. Pt's family's first choice is [facility]. SW sent referral to [facility] and [facility]. SW provided a SNF list for back up choices, family will provide choices to SW.   Pt's was concerned regarding insurance coverage of hospital days, SW informed them it just means they will review tomorrow and submit for additional days. SW updated Pt's emergency contact information. SW following for DCP.      The Plan for Transition of Care is related to the following treatment goals of Closed displaced fracture of left femoral neck ([facility] [address] [S72.002A]    The Patient and/or patient representative Gallito Avitia and her family were provided with a choice of provider and agrees with the discharge plan Yes    Freedom of choice list was provided with basic dialogue that supports the patient's individualized plan of care/goals and shares the quality data associated with the providers.  Yes    Care Transition patient: No    KUSUM Merrill, [facility]  Case Management Department  Ph: [phone]

## 2022-04-11 NOTE — PLAN OF CARE
Problem: Skin Integrity:  Goal: Will show no infection signs and symptoms  Description: Will show no infection signs and symptoms  Outcome: Ongoing     Problem: Falls - Risk of:  Goal: Will remain free from falls  Description: Will remain free from falls  4/11/2022 1047 by Kiera Garcia RN  Outcome: Ongoing     Problem: Falls - Risk of:  Goal: Will remain free from falls  Description: Will remain free from falls  4/11/2022 1047 by Kiera Garcia RN  Outcome: Ongoing

## 2022-04-11 NOTE — PROGRESS NOTES
Orthopedic Progress Note     Subjective:  Patient sitting up in bed comfortably; no complaints     Objective:  AAOx3  Left hip dressing clean, dry, intact  Mild left thigh swelling  LLE NV intact L1-S1 with motor/sensory function intact  +LLE palpable pedal pulses  Left thigh/leg compartments soft/compressible     A/P: POD#3 s/p left hip hemiarthroplasty  1. WBAT  2. PT/OT/OOB w/ assistance  3. Pain control; minimize narcotic pain meds  4. DVT prophylaxis; recommend lovenox or subq heparin for a total of 4 weeks post op  5. Medical management  6.  Discharge planning

## 2022-04-11 NOTE — PROGRESS NOTES
rubs  Abdomen: soft, non-tender, non-distended  Musculoskeletal: Warm, well perfused, no cyanosis or edema  Skin: normal color, no wounds noted  Psychiatric: Awake, alert, interactive, baseline mental status, no focal neuro deficits noted    Labs:   No results for input(s): WBC, HGB, HCT, PLT in the last 72 hours. No results for input(s): NA, K, CL, CO2, BUN, CREATININE, CALCIUM, PHOS in the last 72 hours. Invalid input(s): MAGNES  No results for input(s): AST, ALT, BILIDIR, BILITOT, ALKPHOS in the last 72 hours. No results for input(s): INR in the last 72 hours. No results for input(s): Verlena Moncho in the last 72 hours. Urinalysis:      Lab Results   Component Value Date    NITRU Negative 04/07/2022    WBCUA 0-2 04/07/2022    BACTERIA 2+ 04/07/2022    RBCUA None seen 04/07/2022    BLOODU Negative 04/07/2022    SPECGRAV >=1.030 04/07/2022    GLUCOSEU Negative 04/07/2022       Radiology:  XR HIP 2-3 VW W PELVIS LEFT   Final Result      1. Left hip replacement      XR CHEST PORTABLE   Final Result   1. No evidence of acute cardiopulmonary disease. XR PELVIS (1-2 VIEWS)   Final Result      Acute left femoral neck fracture. LEFT FEMUR 2 VIEWS      INDICATION: Fall. Fracture. FINDINGS: AP and lateral views left femur were obtained. Note is again made of the left femoral neck fracture. No other fractures are identified. No dislocation is identified. IMPRESSION:   1. Acute left femoral neck fracture. XR FEMUR LEFT (MIN 2 VIEWS)   Final Result      Acute left femoral neck fracture. LEFT FEMUR 2 VIEWS      INDICATION: Fall. Fracture. FINDINGS: AP and lateral views left femur were obtained. Note is again made of the left femoral neck fracture. No other fractures are identified. No dislocation is identified. IMPRESSION:   1. Acute left femoral neck fracture. CT Head WO Contrast   Final Result   1. Significant central and cortical atrophic changes   2.  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 No evidence of acute intracranial hemorrhage          Assessment/Plan:    Active Hospital Problems    Diagnosis Date Noted    Closed displaced fracture of left femoral neck ([facility] [address] [S72.002A] 04/07/2022       Plan:    #Traumatic left femoral neck fracture  Due to mechanical fall  Status post OR for repair 4/8  Analgesia as needed, doing well on p.o. today  PT/OT scored 10/15, will need placement  Subcu Lovenox for DVT ppx     #History of dementia  Delirium precautions    #History of CAD, stable  # Remainder of medical conditions  -home management except as above    DVT Prophylaxis: Lovenox  Diet: ADULT DIET;  Regular  Code Status: Full Code    PT/OT Eval Status: Ongoing    Dispo: [place] pending clinical improvement    Guera Morgan MD

## 2022-04-11 NOTE — PROGRESS NOTES
Patient resting in bed. Alert and oriented only to self. Very anxious. Family at bedside. Complains of pain to L hip, PRN oxycodone and dilaudid administered throughout shift. New mepilex placed to L hip incision. All needs met at this time. Will continue to monitor.

## 2022-04-11 NOTE — PROGRESS NOTES
Occupational Therapy  Facility/Department: [facility] 113 5T ORTHO/NEURO  Daily Treatment Note  NAME: Arnoldo Farrell  : 1935  MRN: [id number]    Date of Service: 2022    Discharge Recommendations: Arnoldo Farrell scored a 14/24 on the AM-PAC ADL Inpatient form. Current research shows that an AM-PAC score of 17 or less is typically not associated with a discharge to the patient's home setting. Based on the patient's AM-PAC score and their current ADL deficits, it is recommended that the patient have 3-5 sessions per week of Occupational Therapy at d/c to increase the patient's independence. Please see assessment section for further patient specific details. If patient discharges prior to next session this note will serve as a discharge summary. Please see below for the latest assessment towards goals. OT Equipment Recommendations  Other: defer to next care facility    Assessment   Performance deficits / Impairments: Decreased functional mobility ; Decreased endurance;Decreased ADL status  Assessment: Pt requires max vcs and physical cues to adhere to hip precautions. Pt has dementia at baseline. Pt with increased assist this date requiring Max A x 2 for stand pivot transfers and pt not able to ambulate due to pain and posterior lean. Recommend continued inpt therapy at d/c to maximize functional independence and safety. Continue with POC. Treatment Diagnosis: impaired ADLs /functional mobility 2/2 L hip fx s/p repair  Prognosis: Good  OT Education: OT Role;Plan of Care;Transfer Training;Precautions  Patient Education: No learning / no carryover noted - continued education and reinforcement  Barriers to Learning: cpg  REQUIRES OT FOLLOW UP: Yes  Activity Tolerance  Activity Tolerance: Patient Tolerated treatment well;Patient limited by pain  Activity Tolerance: pt fearful of falling  Safety Devices  Safety Devices in place: Yes  Type of devices: Call light within reach; Chair alarm in place;Nurse notified; Left in chair; All fall risk precautions in place         Patient Diagnosis(es): The encounter diagnosis was Closed displaced fracture of left femoral neck ([address] has no past medical history on file. has a past surgical history that includes hip surgery (Left, 4/8/2022). Restrictions  Position Activity Restriction  Other position/activity restrictions: up as tolerated , WBAT LLE, hip precautions - orders do not specify anterior vs posterior  Subjective   General  Chart Reviewed: Yes  Additional Pertinent Hx: Admit 4/7 with fall + L femoral neck fracture on scans      ortho consult to OR 4/8 s/p L Hemiarthroplasty                                 PMHX: dementia, CAD,  Response to previous treatment: Patient with no complaints from previous session  Family / Caregiver Present: Yes (granddaughter, )  Diagnosis: fall , L femoral neck fx  Subjective  Subjective: Pt supine in bed upon entry, very pleasant and agreeable to therapy session. Pt oriented to situation (fall) and person. Max vcs for hip precautions and physical assist to correct. General Comment  Comments: Pt supine to sit Mod A + Min A. Pt sit EOB SBA/CGA. Pt sit to stand (x 2 trials) Max A x2 and stand to sit (Max A x 2 - x2 trials). Pt sit to stand third trials and stand pivot Max A x2 to [facility]. Pt Dependent for toileting and brief change. Pt stand pivot to chair Max A x 2. Pt with seated rest break. Pt sit to stand Max A x 2 and stance Max A to place air cushion. Pt stand to sit Max A x2. Call light in reach and chair alarm on.   Pain Assessment  Pain Type: Acute pain  Pain Location: Hip  Pain Orientation: Left  Pain Descriptors: Aching  Pain Frequency: Continuous  Clinical Progression: Not changed  Functional Pain Assessment: Prevents or interferes some active activities and ADLs  Pre Treatment Pain Screening  Intervention List: Patient able to continue with treatment  Comments / Details: RN administered pain meds prior to therapy  Vital Signs  Patient Currently in Pain: Yes (pain with mobility, states better with rest - pt didn't rate)   Orientation  Orientation  Overall Orientation Status: Impaired  Orientation Level: Oriented to situation;Oriented to person;Disoriented to time;Disoriented to situation  Objective    ADL  LE Dressing: Dependent/Total  Toileting:  (continued vcs to adhere to hip precautions with physical cues also)        Balance  Sitting Balance:  (SBA/CGA)  Standing Balance: Maximum assistance  Standing Balance  Time: ~3 min  Activity: stand pivot transfers, toileting, static  Functional Mobility  Functional Mobility Comments: pt unable to ambulate with strong posterior lean and pain  Toilet Transfers  Toilet - Technique: Stand pivot  Equipment Used: Standard bedside commode  Toilet Transfer: 2 Person assistance;Dependent/Total (Max A x 2)  Bed mobility  Supine to Sit: 2 Person assistance (Mod A + Min A)  Scooting: Maximal assistance  Transfers  Stand Pivot Transfers: 2 Person assistance;Dependent/Total  Sit to stand: 2 Person assistance;Dependent/Total  Stand to sit: 2 Person assistance;Dependent/Total  Transfer Comments: all Max A x 2                       Cognition  Overall Cognitive Status: Exceptions  Arousal/Alertness: Appropriate responses to stimuli  Following Commands: Follows one step commands with repetition; Follows one step commands with increased time  Attention Span: Difficulty attending to directions  Memory: Decreased short term memory;Decreased recall of recent events;Decreased recall of precautions;Decreased recall of biographical Information  Safety Judgement: Decreased awareness of need for safety;Decreased awareness of need for assistance  Insights: Not aware of deficits  Initiation: Requires cues for all  Sequencing: Requires cues for all  Cognition Comment: Pt with moderated dementia - needing ongoing cues for tasks/ initiation and to follow precautions Plan   Plan  Times per week: 5-7x  Times per day: Daily  Current Treatment Recommendations: Endurance Training,Functional Mobility Training,Safety Education & Training,Self-Care / ADL,Equipment Evaluation, Education, & procurement,Patient/Caregiver Education & Training  G-Code     OutComes Score                                                  AM-PAC Score        AM-PAC Inpatient Daily Activity Raw Score: 14 (04/11/22 1010)  AM-PAC Inpatient ADL T-Scale Score : 33.39 (04/11/22 1010)  ADL Inpatient CMS 0-100% Score: 59.67 (04/11/22 1010)  ADL Inpatient CMS G-Code Modifier : CK (04/11/22 1010)    Goals  Short term goals  Time Frame for Short term goals: at d/c  Short term goal 1: Stance x 7 mins with SBA for ADLs /IADLs - continue  Short term goal 2: LE Dressing with Min A and AE prn - continue  Short term goal 3: Commode transfers with CGA - continue  Short term goal 4: Pt /family verb hip precautions s/p L tay arthoplasty - continue       Therapy Time   Individual Concurrent Group Co-treatment   Time In [address]         Time Out 0952         Minutes 45         Timed Code Treatment Minutes: [address] [facility]

## 2022-04-12 LAB
ALBUMIN SERPL-MCNC: 2.8 G/DL (ref 3.4–5)
ANION GAP SERPL CALCULATED.3IONS-SCNC: 12 MMOL/L (ref 3–16)
BUN BLDV-MCNC: 14 MG/DL (ref 7–20)
CALCIUM SERPL-MCNC: 8.7 MG/DL (ref 8.3–10.6)
CHLORIDE BLD-SCNC: 106 MMOL/L (ref 99–110)
CO2: 22 MMOL/L (ref 21–32)
CREAT SERPL-MCNC: 0.6 MG/DL (ref 0.6–1.2)
GFR AFRICAN AMERICAN: >60
GFR NON-AFRICAN AMERICAN: >60
GLUCOSE BLD-MCNC: 109 MG/DL (ref 70–99)
HCT VFR BLD CALC: 33.8 % (ref 36–48)
HEMOGLOBIN: 11.6 G/DL (ref 12–16)
MCH RBC QN AUTO: 32.1 PG (ref 26–34)
MCHC RBC AUTO-ENTMCNC: 34.3 G/DL (ref 31–36)
MCV RBC AUTO: 93.8 FL (ref 80–100)
PDW BLD-RTO: 12.7 % (ref 12.4–15.4)
PHOSPHORUS: 2.8 MG/DL (ref 2.5–4.9)
PLATELET # BLD: 193 K/UL (ref 135–450)
PMV BLD AUTO: 7.9 FL (ref 5–10.5)
POTASSIUM SERPL-SCNC: 3.9 MMOL/L (ref 3.5–5.1)
RBC # BLD: 3.6 M/UL (ref 4–5.2)
SODIUM BLD-SCNC: 140 MMOL/L (ref 136–145)
WBC # BLD: 7.5 K/UL (ref 4–11)

## 2022-04-12 PROCEDURE — 97530 THERAPEUTIC ACTIVITIES: CPT

## 2022-04-12 PROCEDURE — 6370000000 HC RX 637 (ALT 250 FOR IP): Performed by: ORTHOPAEDIC SURGERY

## 2022-04-12 PROCEDURE — 97116 GAIT TRAINING THERAPY: CPT

## 2022-04-12 PROCEDURE — 1200000000 HC SEMI PRIVATE

## 2022-04-12 PROCEDURE — 2580000003 HC RX 258: Performed by: ORTHOPAEDIC SURGERY

## 2022-04-12 PROCEDURE — 80069 RENAL FUNCTION PANEL: CPT

## 2022-04-12 PROCEDURE — 36415 COLL VENOUS BLD VENIPUNCTURE: CPT

## 2022-04-12 PROCEDURE — 6360000002 HC RX W HCPCS: Performed by: ORTHOPAEDIC SURGERY

## 2022-04-12 PROCEDURE — 85027 COMPLETE CBC AUTOMATED: CPT

## 2022-04-12 RX ADMIN — SODIUM CHLORIDE, PRESERVATIVE FREE 10 ML: 5 INJECTION INTRAVENOUS at 21:00

## 2022-04-12 RX ADMIN — ACETAMINOPHEN 1000 MG: 500 TABLET ORAL at 14:05

## 2022-04-12 RX ADMIN — OXYCODONE 10 MG: 5 TABLET ORAL at 03:41

## 2022-04-12 RX ADMIN — ACETAMINOPHEN 1000 MG: 500 TABLET ORAL at 21:08

## 2022-04-12 RX ADMIN — SENNOSIDES AND DOCUSATE SODIUM 1 TABLET: 50; 8.6 TABLET ORAL at 09:03

## 2022-04-12 RX ADMIN — ISOSORBIDE MONONITRATE 30 MG: 30 TABLET, EXTENDED RELEASE ORAL at 09:03

## 2022-04-12 RX ADMIN — TRAMADOL HYDROCHLORIDE 50 MG: 50 TABLET, COATED ORAL at 18:26

## 2022-04-12 RX ADMIN — METOPROLOL SUCCINATE 25 MG: 25 TABLET, EXTENDED RELEASE ORAL at 09:03

## 2022-04-12 RX ADMIN — ACETAMINOPHEN 1000 MG: 500 TABLET ORAL at 06:32

## 2022-04-12 RX ADMIN — SODIUM CHLORIDE, PRESERVATIVE FREE 10 ML: 5 INJECTION INTRAVENOUS at 09:04

## 2022-04-12 RX ADMIN — OXYCODONE 10 MG: 5 TABLET ORAL at 22:36

## 2022-04-12 RX ADMIN — OXYCODONE 5 MG: 5 TABLET ORAL at 09:03

## 2022-04-12 RX ADMIN — ENOXAPARIN SODIUM 30 MG: 100 INJECTION SUBCUTANEOUS at 21:08

## 2022-04-12 RX ADMIN — OXYCODONE 10 MG: 5 TABLET ORAL at 16:34

## 2022-04-12 RX ADMIN — SENNOSIDES AND DOCUSATE SODIUM 1 TABLET: 50; 8.6 TABLET ORAL at 21:08

## 2022-04-12 RX ADMIN — ENOXAPARIN SODIUM 30 MG: 100 INJECTION SUBCUTANEOUS at 09:03

## 2022-04-12 RX ADMIN — HYDROMORPHONE HYDROCHLORIDE 0.25 MG: 1 INJECTION, SOLUTION INTRAMUSCULAR; INTRAVENOUS; SUBCUTANEOUS at 06:32

## 2022-04-12 RX ADMIN — CETIRIZINE HYDROCHLORIDE 10 MG: 10 TABLET, FILM COATED ORAL at 09:03

## 2022-04-12 RX ADMIN — ATORVASTATIN CALCIUM 20 MG: 20 TABLET, FILM COATED ORAL at 09:03

## 2022-04-12 ASSESSMENT — PAIN SCALES - GENERAL
PAINLEVEL_OUTOF10: 0
PAINLEVEL_OUTOF10: 6
PAINLEVEL_OUTOF10: 0
PAINLEVEL_OUTOF10: 7
PAINLEVEL_OUTOF10: 4
PAINLEVEL_OUTOF10: 0
PAINLEVEL_OUTOF10: 3

## 2022-04-12 ASSESSMENT — PAIN DESCRIPTION - PROGRESSION
CLINICAL_PROGRESSION: GRADUALLY WORSENING
CLINICAL_PROGRESSION: GRADUALLY WORSENING
CLINICAL_PROGRESSION: NOT CHANGED

## 2022-04-12 ASSESSMENT — PAIN DESCRIPTION - FREQUENCY
FREQUENCY: CONTINUOUS

## 2022-04-12 ASSESSMENT — PAIN DESCRIPTION - ONSET
ONSET: ON-GOING

## 2022-04-12 ASSESSMENT — PAIN SCALES - PAIN ASSESSMENT IN ADVANCED DEMENTIA (PAINAD)
CONSOLABILITY: 1
NEGVOCALIZATION: 2
BREATHING: 2
TOTALSCORE: 7
TOTALSCORE: 7
CONSOLABILITY: 1
BREATHING: 0
NEGVOCALIZATION: 2
BODYLANGUAGE: 1
BREATHING: 2
NEGVOCALIZATION: 2
TOTALSCORE: 6
FACIALEXPRESSION: 1
CONSOLABILITY: 2
FACIALEXPRESSION: 2
FACIALEXPRESSION: 1
BODYLANGUAGE: 1
BODYLANGUAGE: 0

## 2022-04-12 ASSESSMENT — PAIN - FUNCTIONAL ASSESSMENT
PAIN_FUNCTIONAL_ASSESSMENT: PREVENTS OR INTERFERES SOME ACTIVE ACTIVITIES AND ADLS
PAIN_FUNCTIONAL_ASSESSMENT: PREVENTS OR INTERFERES SOME ACTIVE ACTIVITIES AND ADLS

## 2022-04-12 ASSESSMENT — PAIN DESCRIPTION - PAIN TYPE
TYPE: SURGICAL PAIN

## 2022-04-12 ASSESSMENT — PAIN DESCRIPTION - LOCATION
LOCATION: HIP

## 2022-04-12 ASSESSMENT — PAIN DESCRIPTION - ORIENTATION
ORIENTATION: LEFT

## 2022-04-12 ASSESSMENT — PAIN DESCRIPTION - DESCRIPTORS
DESCRIPTORS: ACHING
DESCRIPTORS: ACHING

## 2022-04-12 NOTE — PROGRESS NOTES
Progress Note        Date:2022       Room:5506/5506-01  Patient Tennille Younger     YOB: 1935     Age:87 y.o. Chief Complaint   Patient presents with    Hip Pain     Pt fell from standing, witnessed fall, no LOC (LEFT HIP)            Subjective   Interval History Status: improved. Overnight owning. This am pleasant. Mild confusion +  Per RN no overnight issues      Review of Systems   ROS as mentioned above. Medications   Scheduled Meds:    enoxaparin  30 mg SubCUTAneous BID    acetaminophen  1,000 mg Oral 3 times per day    atorvastatin  20 mg Oral Daily    fluticasone  1 spray Each Nostril Daily    isosorbide mononitrate  30 mg Oral Daily    metoprolol succinate  25 mg Oral Daily    cetirizine  10 mg Oral Daily    sodium chloride flush  10 mL IntraVENous 2 times per day    sennosides-docusate sodium  1 tablet Oral BID     Continuous Infusions:    sodium chloride       PRN Meds: oxyCODONE, oxyCODONE, traMADol, sodium chloride flush, sodium chloride, promethazine **OR** ondansetron, polyethylene glycol    Past History    Past Medical History:   has no past medical history on file. Social History:   reports that she has never smoked. She has never used smokeless tobacco. She reports that she does not drink alcohol and does not use drugs. Family History: History reviewed. No pertinent family history. Physical Examination      Vitals:  BP (!) 165/84   Pulse 89   Temp 97.6 °F (36.4 °C) (Oral)   Resp 16   Ht 5' 2\" (1.575 m)   Wt 128 lb 12.8 oz (58.4 kg)   SpO2 93%   BMI 23.56 kg/m²   Temp (24hrs), Av.9 °F (36.6 °C), Min:97.6 °F (36.4 °C), Max:98.6 °F (37 °C)      I/O (24Hr): Intake/Output Summary (Last 24 hours) at 2022 1144  Last data filed at 2022 0915  Gross per 24 hour   Intake 135 ml   Output --   Net 135 ml       Physical Exam  Constitutional:       Appearance: Normal appearance. HENT:      Head: Normocephalic. Cardiovascular:      Rate and Rhythm: Normal rate and regular rhythm. Pulses: Normal pulses. Heart sounds: Normal heart sounds. Pulmonary:      Effort: Pulmonary effort is normal.      Breath sounds: Normal breath sounds. Abdominal:      General: Abdomen is flat. Palpations: Abdomen is soft. Musculoskeletal:      Comments: Left hip dressing in place. No saturation with blood. Skin:     General: Skin is warm and dry. Capillary Refill: Capillary refill takes less than 2 seconds. Neurological:      General: No focal deficit present. Mental Status: She is alert. Mental status is at baseline. Assessment      Principal Problem:    Closed displaced fracture of left femoral neck (HCC)  Resolved Problems:    * No resolved hospital problems. *       Plan:          Plan:     #Traumatic left femoral neck fracture  Due to mechanical fall  Status post OR for repair 4/8  Analgesia as needed,dc IV pain meds. Lovenox for DVT ppx  PT OT, placement. Discussed with . #History of dementia  Delirium precautions    Discussed with family at bed side.      Jona Ewing MD  11:44 AM  04/12/22

## 2022-04-12 NOTE — PROGRESS NOTES
Pt alert to self only, VSS. Pain being managed with medications per MAR. Surgical incision CDI. Pt is incontinent and brief changed frequently. Sandi care provided with each incontinence episode. Tolerating diet and fluids with no issues. LR running 100 mL/hr. Fall precautions in place. Pt in bed with call light in reach.

## 2022-04-12 NOTE — PROGRESS NOTES
IV out of pt upon entering room at this time. Inserted 22 G IV into right forearm. Pt tolerated fairly well.

## 2022-04-12 NOTE — PROGRESS NOTES
Physical Therapy  Facility/Department: [facility] 5T ORTHO/NEURO  Daily Treatment Note  NAME: Kary Ayala  : 1935  MRN: [id number]    Date of Service: 2022    Discharge Recommendations: Kary Ayala scored a 10/24 on the AM-PAC short mobility form. Current research shows that an AM-PAC score of 17 or less is typically not associated with a discharge to the patient's home setting. Based on the patient's AM-PAC score and their current functional mobility deficits, it is recommended that the patient have 3-5 sessions per week of Physical Therapy at d/c to increase the patient's independence. Please see assessment section for further patient specific details. PT Equipment Recommendations  Equipment Needed: No  Other: defer to next level of care    Assessment   Body structures, Functions, Activity limitations: Decreased functional mobility   Assessment: Patient able to take some sidesteps toward head of bed on two trials during today's session. She requires CGA to stand and min assist to take steps though additional support needed to hold down walker for stability to avoid posterior lean. Patient appears to be self limiting at times, needing continuous verbal cues for encouragement. Patient reporting pain in left hip with all mobility. Continue to recommend skilled PT upon discharge. Will follow while in acute care setting to maximize independence with mobility. Recommend use of bedside commode with RN staff and FWW for increased mobility; RN notified at end of session. Treatment Diagnosis: impaired gait and transfers  Prognosis: Good  PT Education: PT Role;Functional Mobility Training;General Safety;Gait Training;Transfer Training  Patient Education: hip precautions; WBAT L LE; pt demos partial understanding; rec reinforcement. REQUIRES PT FOLLOW UP: Yes  Activity Tolerance  Activity Tolerance: Patient limited by fatigue;Patient limited by endurance; Patient limited by cognitive status Patient Diagnosis(es): The encounter diagnosis was Closed displaced fracture of left femoral neck ([address] has no past medical history on file. has a past surgical history that includes hip surgery (Left, 4/8/2022). Restrictions  Position Activity Restriction  Other position/activity restrictions: up as tolerated , WBAT LLE, hip precautions - anterior  Subjective   General  Chart Reviewed: Yes  Additional Pertinent Hx: Admit 4/7 s/p fall with c/o hip pain; (+) L femoral neck fx; to OR 4/8 for L hip hemiarthroplasty; PMHx: dementia  Response To Previous Treatment: Patient reporting fatigue but able to participate. Family / Caregiver Present: Yes ( and daughter)  Subjective  Subjective: Patient supine in bed upon arrival, agreeable to PT treatment with encouragement. Pain Screening  Patient Currently in Pain: Yes (pain with mobility, not rated, RN aware)  Vital Signs  Patient Currently in Pain: Yes (pain with mobility, not rated, RN aware)       Orientation  Orientation  Overall Orientation Status: Impaired  Orientation Level: Oriented to place;Oriented to person;Disoriented to situation;Disoriented to time  Cognition   Cognition  Overall Cognitive Status: Exceptions  Arousal/Alertness: Appropriate responses to stimuli  Following Commands: Follows one step commands with repetition; Follows one step commands with increased time  Attention Span: Attends with cues to redirect; Difficulty attending to directions  Memory: Decreased short term memory;Decreased recall of recent events;Decreased recall of precautions;Decreased recall of biographical Information  Safety Judgement: Decreased awareness of need for safety;Decreased awareness of need for assistance  Insights: Decreased awareness of deficits  Initiation: Requires cues for all  Sequencing: Requires cues for all  Cognition Comment: Pt with moderate dementia - needing ongoing cues for tasks/ initiation and to follow precautions; self limiting at Mobility Raw Score : 10 (04/12/22 1546)  AM-PAC Inpatient T-Scale Score : 32.29 (04/12/22 1546)  Mobility Inpatient CMS 0-100% Score: 76.75 (04/12/22 1546)  Mobility Inpatient CMS G-Code Modifier : CL (04/12/22 1546)          Goals  Short term goals  Time Frame for Short term goals: discharge  Short term goal 1: Pt will transfer supine <--> sit with SBA  ongoing  Short term goal 2: Pt will transfer sit <--> stand with min A   ongoing (keep for consistency)  Short term goal 3: Pt will amb 40 ft with RW and min A   ongoing  Short term goal 4: Pt will perform 10 reps L LE ex per protocol   ongoing  Patient Goals   Patient goals : eventually return home with     Plan    Plan  Times per week: 5-7  Current Treatment Recommendations: Balance Training,Functional Mobility Training,Transfer Training,Gait Training,Patient/Caregiver Education & Training,Home Exercise Program,Strengthening,Equipment Evaluation, Education, & procurement  Safety Devices  Type of devices: Left in bed,Gait belt,Call light within reach,Nurse notified,Bed alarm in place     Therapy Time   Individual Concurrent Group Co-treatment   Time In 1433         Time Out 1458         Minutes 25         Timed Code Treatment Minutes: 25 Minutes     If patient discharges prior to next session this note will serve as a discharge summary. Please see below for the latest assessment towards goals.    [address]

## 2022-04-12 NOTE — PROGRESS NOTES
Occupational Therapy  Facility/Department: [facility] 5T ORTHO/NEURO  Daily Treatment Note  NAME: Anup Zayas  : 1935  MRN: [id number]    Date of Service: 2022    Discharge Recommendations: Anup Zayas scored a 14/24 on the AM-PAC ADL Inpatient form. Current research shows that an AM-PAC score of 17 or less is typically not associated with a discharge to the patient's home setting. Based on the patient's AM-PAC score and their current ADL deficits, it is recommended that the patient have 3-5 sessions per week of Occupational Therapy at d/c to increase the patient's independence. Please see assessment section for further patient specific details. If patient discharges prior to next session this note will serve as a discharge summary. Please see below for the latest assessment towards goals. OT Equipment Recommendations  Equipment Needed: No  Other: defer to next care facility    Assessment   Performance deficits / Impairments: Decreased functional mobility ; Decreased endurance;Decreased ADL status    Assessment: Pt requires max vcs and physical cues to adhere to hip precautions. Pt has dementia at baseline. Pt initially required Min + Mod A for sit<>stand transfer, however on second attempt completed w/ CGA x1 + Assist x1 to keep RW on ground to avoid posterior lean and LOB . Recommend continued inpt therapy at d/c to maximize functional independence and safety. Continue with POC.     Treatment Diagnosis: impaired ADLs /functional mobility 2/2 L hip fx s/p repair  Prognosis: Good  OT Education: OT Role;Plan of Care;Transfer Training;Precautions  Patient Education: No learning / no carryover noted - continued education and reinforcement  REQUIRES OT FOLLOW UP: Yes  Activity Tolerance  Activity Tolerance: Patient Tolerated treatment well;Treatment limited secondary to decreased cognition;Patient limited by fatigue;Patient limited by pain  Activity Tolerance: Pt requires max encouragement for participation  Safety Devices  Safety Devices in place: Yes  Type of devices: Left in bed;Bed alarm in place;Call light within reach;Nurse notified         Patient Diagnosis(es): The encounter diagnosis was Closed displaced fracture of left femoral neck ([address] has no past medical history on file. has a past surgical history that includes hip surgery (Left, 4/8/2022). Restrictions  Position Activity Restriction  Other position/activity restrictions: up as tolerated , WBAT LLE, hip precautions - anterior  Subjective   General  Chart Reviewed: Yes  Additional Pertinent Hx: Admit 4/7 with fall + L femoral neck fracture on scans      ortho consult to OR 4/8 s/p L Hemiarthroplasty                                 PMHX: dementia, CAD,  Response to previous treatment: Patient with no complaints from previous session  Family / Caregiver Present: Yes (daughter and )  Diagnosis: fall , L femoral neck fx  Subjective  Subjective: Pt supine in bed upon arrival, pleasant and agreeable to therapy session. General Comment  Comments: . Orientation  Orientation  Overall Orientation Status: Impaired  Orientation Level: Oriented to person;Oriented to place; Disoriented to situation;Disoriented to time  Objective    ADL  Additional Comments: Pt needing freq cues / reminders to stay on task. Pt w/ self-limiting behaviors and requiring frequent encouragement        Balance  Sitting Balance: Minimal assistance (-CGA seated unsupported at EOB)  Standing Balance: Contact guard assistance (Min A + assist to hold down RW to avoid posterior lean during side steps. CGA for statioc stance at RW + assist to hold down RW to avoid posterior lean)  Standing Balance  Time: ~5min total  Activity: stance at EOB;  Side steps to L (~5-6)  Functional Mobility  Functional - Mobility Device: Rolling Walker  Activity: Other (~5-6 steps)  Assist Level: Minimal assistance  Functional Mobility Comments: 2 person assist: Min Ax1 + Assist x1 to Education & Training  G-Code     OutComes Score                                                  AM-PAC Score        AM-PAC Inpatient Daily Activity Raw Score: 14 (04/12/22 1609)  AM-PAC Inpatient ADL T-Scale Score : 33.39 (04/12/22 1609)  ADL Inpatient CMS 0-100% Score: 59.67 (04/12/22 1609)  ADL Inpatient CMS G-Code Modifier : CK (04/12/22 1609)    Goals  Short term goals  Time Frame for Short term goals: at d/c  Short term goal 1: Stance x 7 mins with SBA for ADLs /IADLs - continue  Short term goal 2: LE Dressing with Min A and AE prn - continue  Short term goal 3: Commode transfers with CGA - continue  Short term goal 4: Pt /family verb hip precautions s/p L tay arthoplasty - continue       Therapy Time   Individual Concurrent Group Co-treatment   Time In [address]         Time Out 1458         Minutes [address], [place]

## 2022-04-12 NOTE — PROGRESS NOTES
Orthopedic Progress Note     Subjective:  Patient sitting up in bed comfortably; no complaints     Objective:  AAOx3  Left hip dressing clean, dry, intact  Mild left thigh swelling  LLE NV intact L1-S1 with motor/sensory function intact  +LLE palpable pedal pulses  Left thigh/leg compartments soft/compressible     A/P: POD#4 s/p left hip hemiarthroplasty  1. WBAT  2. PT/OT/OOB w/ assistance  3. Pain control; minimize narcotic pain meds  4. DVT prophylaxis; recommend lovenox or subq heparin for a total of 4 weeks post op  5. Medical management  6.  Discharge planning

## 2022-04-12 NOTE — CARE COORDINATION
SW spoke to [facility], they are reviewing and will call back but they have limited bed availability. SW sent additional referrals to 4 and 5 [place] facilities near  to see what is available for Pt. SW following for DCP.   Electronically signed by KUSUM Hood, KENRICK on 4/12/2022 at 5:30 PM  [phone]

## 2022-04-12 NOTE — PROGRESS NOTES
Patient is alert and oriented x4 with intermittent confusion. Vital signs stable. L hip pain controlled with PRN oxy and tramadol. Surgical site to let hip covered with mepilex, CDI. Weak left dorsi/plantar flexion. Tolerating diet well, denies n/v. Voiding without complications. Ambulating x2 assist stand/pivot to commode. Fall precautions in place. Bed locked in lowest position with alarm on. Call light within reach. Will continue to monitor.

## 2022-04-12 NOTE — PLAN OF CARE
Problem: Pain:  Goal: Control of acute pain  Description: Control of acute pain  Outcome: Ongoing  Note: Assess pt's pain throughout shift. Administer pain medications per MAR. Offer non-pharmacological techniques such as repositioning to help manage pt's pain. Problem: Falls - Risk of:  Goal: Will remain free from falls  Description: Will remain free from falls  Outcome: Ongoing  Note: Bed in lowest position. Bed alarm and gripper socks on. Call light in reach of pt.

## 2022-04-13 PROCEDURE — 6370000000 HC RX 637 (ALT 250 FOR IP): Performed by: ORTHOPAEDIC SURGERY

## 2022-04-13 PROCEDURE — 6360000002 HC RX W HCPCS: Performed by: ORTHOPAEDIC SURGERY

## 2022-04-13 PROCEDURE — 97535 SELF CARE MNGMENT TRAINING: CPT

## 2022-04-13 PROCEDURE — 97530 THERAPEUTIC ACTIVITIES: CPT

## 2022-04-13 PROCEDURE — 6370000000 HC RX 637 (ALT 250 FOR IP): Performed by: INTERNAL MEDICINE

## 2022-04-13 PROCEDURE — 6360000002 HC RX W HCPCS: Performed by: INTERNAL MEDICINE

## 2022-04-13 PROCEDURE — 1200000000 HC SEMI PRIVATE

## 2022-04-13 PROCEDURE — 97110 THERAPEUTIC EXERCISES: CPT

## 2022-04-13 PROCEDURE — 97116 GAIT TRAINING THERAPY: CPT

## 2022-04-13 PROCEDURE — 2580000003 HC RX 258: Performed by: ORTHOPAEDIC SURGERY

## 2022-04-13 RX ORDER — POLYETHYLENE GLYCOL 3350 17 G/17G
17 POWDER, FOR SOLUTION ORAL DAILY PRN
Qty: 527 G | Refills: 1 | Status: SHIPPED | OUTPATIENT
Start: 2022-04-13 | End: 2022-05-13

## 2022-04-13 RX ORDER — BISACODYL 10 MG
10 SUPPOSITORY, RECTAL RECTAL ONCE
Status: COMPLETED | OUTPATIENT
Start: 2022-04-13 | End: 2022-04-13

## 2022-04-13 RX ORDER — TRAMADOL HYDROCHLORIDE 50 MG/1
50 TABLET ORAL EVERY 4 HOURS PRN
Qty: 18 TABLET | Refills: 0 | Status: SHIPPED | OUTPATIENT
Start: 2022-04-13 | End: 2022-04-16

## 2022-04-13 RX ORDER — HYDRALAZINE HYDROCHLORIDE 20 MG/ML
5 INJECTION INTRAMUSCULAR; INTRAVENOUS ONCE
Status: COMPLETED | OUTPATIENT
Start: 2022-04-13 | End: 2022-04-13

## 2022-04-13 RX ORDER — TRAMADOL HYDROCHLORIDE 50 MG/1
50 TABLET ORAL EVERY 4 HOURS PRN
Qty: 18 TABLET | Refills: 0 | Status: SHIPPED | OUTPATIENT
Start: 2022-04-13 | End: 2022-04-13

## 2022-04-13 RX ORDER — SENNA AND DOCUSATE SODIUM 50; 8.6 MG/1; MG/1
1 TABLET, FILM COATED ORAL 2 TIMES DAILY
Qty: 14 TABLET | Refills: 0 | Status: SHIPPED | OUTPATIENT
Start: 2022-04-13 | End: 2022-04-20

## 2022-04-13 RX ORDER — OXYCODONE HYDROCHLORIDE 5 MG/1
5 TABLET ORAL EVERY 6 HOURS PRN
Qty: 20 TABLET | Refills: 0 | Status: SHIPPED | OUTPATIENT
Start: 2022-04-13 | End: 2022-04-13

## 2022-04-13 RX ORDER — OXYCODONE HYDROCHLORIDE 5 MG/1
5 TABLET ORAL EVERY 6 HOURS PRN
Qty: 20 TABLET | Refills: 0 | Status: SHIPPED | OUTPATIENT
Start: 2022-04-13 | End: 2022-04-18

## 2022-04-13 RX ADMIN — CETIRIZINE HYDROCHLORIDE 10 MG: 10 TABLET, FILM COATED ORAL at 09:17

## 2022-04-13 RX ADMIN — OXYCODONE 10 MG: 5 TABLET ORAL at 06:01

## 2022-04-13 RX ADMIN — ISOSORBIDE MONONITRATE 30 MG: 30 TABLET, EXTENDED RELEASE ORAL at 09:16

## 2022-04-13 RX ADMIN — SENNOSIDES AND DOCUSATE SODIUM 1 TABLET: 50; 8.6 TABLET ORAL at 09:17

## 2022-04-13 RX ADMIN — ENOXAPARIN SODIUM 30 MG: 100 INJECTION SUBCUTANEOUS at 09:16

## 2022-04-13 RX ADMIN — ATORVASTATIN CALCIUM 20 MG: 20 TABLET, FILM COATED ORAL at 09:17

## 2022-04-13 RX ADMIN — TRAMADOL HYDROCHLORIDE 50 MG: 50 TABLET, COATED ORAL at 17:39

## 2022-04-13 RX ADMIN — ONDANSETRON 4 MG: 2 INJECTION INTRAMUSCULAR; INTRAVENOUS at 10:11

## 2022-04-13 RX ADMIN — TRAMADOL HYDROCHLORIDE 50 MG: 50 TABLET, COATED ORAL at 03:15

## 2022-04-13 RX ADMIN — ACETAMINOPHEN 1000 MG: 500 TABLET ORAL at 06:01

## 2022-04-13 RX ADMIN — ENOXAPARIN SODIUM 30 MG: 100 INJECTION SUBCUTANEOUS at 21:45

## 2022-04-13 RX ADMIN — METOPROLOL SUCCINATE 25 MG: 25 TABLET, EXTENDED RELEASE ORAL at 09:16

## 2022-04-13 RX ADMIN — SODIUM CHLORIDE, PRESERVATIVE FREE 10 ML: 5 INJECTION INTRAVENOUS at 09:41

## 2022-04-13 RX ADMIN — HYDRALAZINE HYDROCHLORIDE 5 MG: 20 INJECTION INTRAMUSCULAR; INTRAVENOUS at 04:06

## 2022-04-13 RX ADMIN — OXYCODONE 10 MG: 5 TABLET ORAL at 16:09

## 2022-04-13 RX ADMIN — ACETAMINOPHEN 1000 MG: 500 TABLET ORAL at 21:38

## 2022-04-13 RX ADMIN — OXYCODONE 10 MG: 5 TABLET ORAL at 22:45

## 2022-04-13 RX ADMIN — TRAMADOL HYDROCHLORIDE 50 MG: 50 TABLET, COATED ORAL at 21:38

## 2022-04-13 RX ADMIN — SENNOSIDES AND DOCUSATE SODIUM 1 TABLET: 50; 8.6 TABLET ORAL at 21:38

## 2022-04-13 RX ADMIN — Medication 10 MG: at 09:37

## 2022-04-13 RX ADMIN — TRAMADOL HYDROCHLORIDE 50 MG: 50 TABLET, COATED ORAL at 09:23

## 2022-04-13 RX ADMIN — ACETAMINOPHEN 1000 MG: 500 TABLET ORAL at 14:37

## 2022-04-13 RX ADMIN — NALOXEGOL OXALATE 12.5 MG: 12.5 TABLET, FILM COATED ORAL at 09:16

## 2022-04-13 RX ADMIN — SODIUM CHLORIDE, PRESERVATIVE FREE 10 ML: 5 INJECTION INTRAVENOUS at 21:47

## 2022-04-13 ASSESSMENT — PAIN SCALES - GENERAL
PAINLEVEL_OUTOF10: 10
PAINLEVEL_OUTOF10: 0
PAINLEVEL_OUTOF10: 7
PAINLEVEL_OUTOF10: 7
PAINLEVEL_OUTOF10: 3
PAINLEVEL_OUTOF10: 0
PAINLEVEL_OUTOF10: 6
PAINLEVEL_OUTOF10: 8
PAINLEVEL_OUTOF10: 4
PAINLEVEL_OUTOF10: 4
PAINLEVEL_OUTOF10: 5

## 2022-04-13 ASSESSMENT — PAIN DESCRIPTION - PROGRESSION
CLINICAL_PROGRESSION: GRADUALLY WORSENING
CLINICAL_PROGRESSION: NOT CHANGED
CLINICAL_PROGRESSION: NOT CHANGED

## 2022-04-13 ASSESSMENT — PAIN DESCRIPTION - FREQUENCY
FREQUENCY: CONTINUOUS
FREQUENCY: CONTINUOUS

## 2022-04-13 ASSESSMENT — PAIN SCALES - PAIN ASSESSMENT IN ADVANCED DEMENTIA (PAINAD)
FACIALEXPRESSION: 1
CONSOLABILITY: 0
TOTALSCORE: 7
TOTALSCORE: 6
CONSOLABILITY: 1
NEGVOCALIZATION: 1
CONSOLABILITY: 1
TOTALSCORE: 4
CONSOLABILITY: 1
BREATHING: 1
NEGVOCALIZATION: 1
NEGVOCALIZATION: 1
BREATHING: 0
CONSOLABILITY: 1
NEGVOCALIZATION: 0
BODYLANGUAGE: 0
BODYLANGUAGE: 2
FACIALEXPRESSION: 2
BODYLANGUAGE: 1
TOTALSCORE: 8
NEGVOCALIZATION: 2
FACIALEXPRESSION: 2
CONSOLABILITY: 0
NEGVOCALIZATION: 2
FACIALEXPRESSION: 1
BODYLANGUAGE: 1
TOTALSCORE: 4
TOTALSCORE: 0
BREATHING: 2
BODYLANGUAGE: 1
BREATHING: 1
BREATHING: 1
BODYLANGUAGE: 1
FACIALEXPRESSION: 0
BREATHING: 0
FACIALEXPRESSION: 1

## 2022-04-13 ASSESSMENT — PAIN DESCRIPTION - PAIN TYPE
TYPE: SURGICAL PAIN

## 2022-04-13 ASSESSMENT — PAIN DESCRIPTION - LOCATION
LOCATION: HIP

## 2022-04-13 ASSESSMENT — PAIN DESCRIPTION - ORIENTATION
ORIENTATION: LEFT

## 2022-04-13 ASSESSMENT — PAIN DESCRIPTION - DESCRIPTORS
DESCRIPTORS: PATIENT UNABLE TO DESCRIBE
DESCRIPTORS: ACHING
DESCRIPTORS: ACHING

## 2022-04-13 ASSESSMENT — PAIN DESCRIPTION - ONSET
ONSET: ON-GOING
ONSET: ON-GOING

## 2022-04-13 NOTE — PROGRESS NOTES
Physical Therapy  Daily Treatment Note    Discharge Recommendations: Osman Dye scored a 10/24 on the AM-PAC short mobility form. Current research shows that an AM-PAC score of 17 or less is typically not associated with a discharge to the patient's home setting. Based on the patient's AM-PAC score and their current functional mobility deficits, it is recommended that the patient have 3-5 sessions per week of Physical Therapy at d/c to increase the patient's independence. Please see assessment section for further patient specific details. Assessment:  Pt with improved activity tolerance this session. Needs ongoing cueing and encouragement. Pt seems to be mostly limited by L hip discomfort with movement. Pt is currently functioning well below baseline s/p L hip fx/repair. Would benefit from continued IP PT at D/C prior to returning home with . Plan is for SNF. Equipment Needs: Defer to next level of care    Chart Reviewed: Yes     Other position/activity restrictions: up as tolerated , WBAT LLE, hip precautions - anterior   Additional Pertinent Hx: Admit 4/7 s/p fall with c/o hip pain; (+) L femoral neck fx; to OR 4/8 for L hip hemiarthroplasty; PMHx: dementia      Diagnosis: L femoral neck fx   Treatment Diagnosis: impaired gait and transfers    Subjective: Pt in bed initially. , daughter, granddaughter present. Agreeable to working with therapy. \"I'm going to fall. \" Needing frequent encouragement with transfers/taking steps. Pain: None at rest. C/o L hip/thigh/knee discomfort with activity. Not rated. RN aware and addressing. Objective:    Exercises  10 reps B ankle pumps, quad sets, SAQ (min assist on L), heel slides (mod assist on L through tolerated range)    Bed mobility  Supine to sit: Dependent (Max assist x 2), HOB partially with use of rail. (\"I can't do it. \")  Scooting: Max assist to EOB    Transfers  Sit to stand: Dependent (Mod assist x 2) from bed; Dependent (Mod assist Evaluation, Education, & procurement    Therapy Time    Individual  Concurrent  Group  Co-treatment    Time In  1136            Time Out  1221            Minutes  45              Timed Code Treatment Minutes: 45  Total Treatment Minutes: 45    Will continue per plan of care. If patient is discharged prior to next treatment, this note will serve as the discharge summary.     Byron Parnell #[id number]

## 2022-04-13 NOTE — PROGRESS NOTES
Occupational Therapy  Facility/Department: [facility] 5T ORTHO/NEURO  Daily Treatment Note  NAME: Kary Ayala  : 1935  MRN: [id number]    Date of Service: 2022    Discharge Recommendations: Kary Ayala scored a 15/24 on the AM-PAC ADL Inpatient form. Current research shows that an AM-PAC score of 17 or less is typically not associated with a discharge to the patient's home setting. Based on the patient's AM-PAC score and their current ADL deficits, it is recommended that the patient have 3-5 sessions per week of Occupational Therapy at d/c to increase the patient's independence. Please see assessment section for further patient specific details. If patient discharges prior to next session this note will serve as a discharge summary. Please see below for the latest assessment towards goals. OT Equipment Recommendations  Other: defer to next care facility    Assessment   Assessment: Pt needs cues and encouragement throughout session. She also needs increased time, and frequent rest breaks. Currently, she is max A of 2 for supine to sit, mod A of 2 sit><stand, and Hayde of 2 with rolling walker for functional transfers. Pt demonstrated increased indep with toileting. Recommend ongoing inpatient OT at discharge to increase functional indep. Treatment Diagnosis: impaired ADLs /functional mobility 2/2 L hip fx s/p repair  Prognosis: Good  OT Education: OT Role;Plan of Care;Transfer Training;Precautions  Patient Education: No learning / no carryover noted - continued education and reinforcement  REQUIRES OT FOLLOW UP: Yes  Activity Tolerance  Activity Tolerance: Patient Tolerated treatment well;Treatment limited secondary to decreased cognition  Safety Devices  Safety Devices in place: Yes  Type of devices: Left in chair;Nurse notified;Call light within reach         Patient Diagnosis(es): The encounter diagnosis was Closed displaced fracture of left femoral neck ([facility] [address]       has no past medical history on file. has a past surgical history that includes hip surgery (Left, 4/8/2022). Restrictions  Position Activity Restriction  Other position/activity restrictions: up as tolerated , WBAT LLE, hip precautions - anterior  Subjective   General  Chart Reviewed: Yes  Additional Pertinent Hx: Admit 4/7 with fall + L femoral neck fracture on scans      ortho consult to OR 4/8 s/p L Hemiarthroplasty                                 PMHX: dementia, CAD,  Response to previous treatment: Patient with no complaints from previous session  Family / Caregiver Present: Yes (, daughter, granddaughter)  Diagnosis: fall , L femoral neck fx  Subjective  Subjective: Pt in bed, agreeable to work with OT. General Comment  Comments: . Orientation  Orientation  Overall Orientation Status: Impaired  Orientation Level: Oriented to person;Oriented to place; Disoriented to situation;Disoriented to time  Objective    ADL  Grooming: Setup (while seated, able to wipe hands with cloth)  Toileting: Maximum assistance (dep with clothing management)  Additional Comments: Pt needing freq cues / reminders to stay on task. Pt w/ self-limiting behaviors and requiring frequent encouragement        Toilet Transfers  Toilet - Technique: Stand step (with rolling walker and Hayde of 2, assist with walker management, cues, encouragement, increased time/effort)  Equipment Used: Standard bedside commode  Toilet Transfer: 2 Person assistance (Hayde of 2)    Bed mobility  Supine to Sit: 2 Person assistance (head of bed elevated, cues, encouragement, increased time/effort, max A of 2)  Scooting: Maximal assistance    Transfers  Sit to stand: 2 Person assistance (mod A of 2, continual cues)  Stand to sit: 2 Person assistance (mod A of 2, contnual cues)                  Cognition  Overall Cognitive Status: Exceptions  Arousal/Alertness: Appropriate responses to stimuli  Following Commands: Follows one step commands with repetition; Follows one step commands with increased time  Attention Span: Attends with cues to redirect; Difficulty attending to directions  Memory: Decreased short term memory;Decreased recall of recent events;Decreased recall of precautions;Decreased recall of biographical Information  Safety Judgement: Decreased awareness of need for safety;Decreased awareness of need for assistance  Insights: Decreased awareness of deficits  Initiation: Requires cues for all  Sequencing: Requires cues for all  Cognition Comment: Pt with moderate dementia - needing ongoing cues for tasks/ initiation and to follow precautions; self limiting at times with verbal cues for encouragement throughout                              Plan   Plan  Times per week: 5-7x  Times per day: Daily  Current Treatment Recommendations: Endurance Training,Functional Mobility Training,Safety Education & Training,Self-Care / ADL,Equipment Evaluation, Education, & procurement,Patient/Caregiver Education & Training  G-Code     OutComes Score                                                  AM-PAC Score        AM-[facility] Inpatient Daily Activity Raw Score: 15 (04/13/22 1259)  AM-PAC Inpatient ADL T-Scale Score : 34.69 (04/13/22 1259)  ADL Inpatient CMS 0-100% Score: 56.46 (04/13/22 1259)  ADL Inpatient CMS G-Code Modifier : CK (04/13/22 1259)    Goals  Short term goals  Time Frame for Short term goals: at d/c  Short term goal 1: Stance x 7 mins with SBA for ADLs /IADLs - continue  Short term goal 2: LE Dressing with Min A and AE prn - continue  Short term goal 3: Commode transfers with CGA - continue  Short term goal 4: Pt /family verb hip precautions s/p L tay arthoplasty - continue       Therapy Time   Individual Concurrent Group Co-treatment   Time In 1140         Time Out 1220         Minutes 40              Timed Code Treatment Minutes:  40    Total Treatment Minutes:  [address], OTR/L [phone]

## 2022-04-13 NOTE — PLAN OF CARE
Problem: Falls - Risk of:  Goal: Will remain free from falls  Description: Will remain free from falls  4/13/2022 1054 by Mariela Kerns RN  Outcome: Ongoing   Patient at risk for falls. Patient resting quietly in bed. Side rails up x 2. Bed locked in lowest position. Bed alarm on. Bedside table and call light within reach. Patient instructed to call for assistance. Patient verbalized understanding. Will continue to monitor.        Problem: Skin Integrity:  Goal: Will show no infection signs and symptoms  Description: Will show no infection signs and symptoms  Outcome: Ongoing   No new skin issues at this time

## 2022-04-13 NOTE — FLOWSHEET NOTE
04/13/22 0928   Encounter Summary   Services provided to: Patient and family together   Referral/Consult From: Rounding   Continue Visiting   (4/13, jamel )   Complexity of Encounter Moderate   Length of Encounter 15 minutes   Routine   Type Initial   Assessment Calm; Approachable; Hopeful   Intervention Active listening;Explored feelings, thoughts, concerns;Nurtured hope   Outcome Comfort;Expressed gratitude;Engaged in conversation   Staff Lonnie Howell MA

## 2022-04-13 NOTE — PLAN OF CARE
Problem: Pain:  Goal: Control of acute pain  Description: Control of acute pain  Outcome: Ongoing  Note: Assess pt's pain throughout shift and medicate pt appropriately. Assist pt in repositioning as needed to help manage pt's pain. Offer emotional support for pt. Problem: Falls - Risk of:  Goal: Will remain free from falls  Description: Will remain free from falls  Outcome: Ongoing  Note: Bed in lowest position. Bed alarm and gripper socks on. Call light in reach of pt.

## 2022-04-13 NOTE — DISCHARGE SUMMARY
Discharge Summary    Date:4/13/2022        Patient Sis Quinonez     YOB: 1935     Age:87 y.o. Admit Date:4/7/2022   Admission Condition:fair   Discharged Condition:good  Discharge Date: 04/13/22    Discharge Diagnoses   Principal Problem:    Closed displaced fracture of left femoral neck ([address]  Resolved Problems:    * No resolved hospital problems. [facility] Stay   Narrative of Hospital Course:       #Traumatic left femoral neck fracture  Due to mechanical fall  Status post OR for repair 4/8  Pain controlled with p.o. medications  Lovenox for DVT ppx  PT OT, placement. Discussed with . Constipation  Suspect secondary to opiates, surgery, immobility  Started [employer]          #History of dementia  Delirium precautions    Physical exam   Vitals:    04/13/22 0321 04/13/22 0654 04/13/22 0745 04/13/22 0755   BP: (!) 180/88 (!) 140/83 (!) 155/74    Pulse: 90 81 82 86   Resp: 16 16 18    Temp: 97.4 °F (36.3 °C) 97.7 °F (36.5 °C) 97.8 °F (36.6 °C)    TempSrc: Oral Oral Oral    SpO2: 95% 96% 96%    Weight:       Height:             Physical Exam  Constitutional:       General: She is not in acute distress. Appearance: Normal appearance. She is not ill-appearing. HENT:      Head: Normocephalic and atraumatic. Cardiovascular:      Rate and Rhythm: Normal rate and regular rhythm. Pulses: Normal pulses. Heart sounds: Normal heart sounds. Pulmonary:      Effort: Pulmonary effort is normal.      Breath sounds: Normal breath sounds. Abdominal:      General: Abdomen is flat. Palpations: Abdomen is soft. Musculoskeletal:         General: Normal range of motion. Skin:     General: Skin is warm and dry. Capillary Refill: Capillary refill takes less than 2 seconds. Neurological:      General: No focal deficit present. Mental Status: She is alert and oriented to person, place, and time.    Psychiatric:         Mood and Affect: Mood normal. Behavior: Behavior normal.           Consultants:   IP CONSULT TO ORTHOPEDIC SURGERY  IP CONSULT TO HOSPITALIST  IP CONSULT TO CASE MANAGEMENT    Time Spent on Discharge:  45 minutes were spent in patient examination, evaluation, counseling as well as medication reconciliation, prescriptions for required medications, discharge plan and follow up. Surgeries/Procedures Performed:  Procedure(s):  LEFT HIP HEMIARTHROPLASTY          Significant Diagnostic Studies:   Recent Labs:  CBC:   Recent Labs     04/12/22  0951   WBC 7.5   HGB 11.6*   HCT 33.8*   MCV 93.8        BMP:   Recent Labs     04/12/22  0951      K 3.9      CO2 22   PHOS 2.8   BUN 14   CREATININE 0.6     Mag: No results found for: MG  LIVER PROFILE: No results for input(s): AST, ALT, LIPASE, BILIDIR, BILITOT, ALKPHOS in the last 72 hours. Invalid input(s): AMYLASE,  ALB  PT/INR: No results for input(s): PROTIME, INR in the last 72 hours. APTT: No results for input(s): APTT in the last 72 hours. BNP:  No results for input(s): BNP in the last 72 hours. CARDIAC ENZYMES: No results for input(s): CKTOTAL, CKMB, TROPONINI in the last 72 hours. Radiology Last 7 Days:  XR HIP 2-3 VW W PELVIS LEFT   Final Result      1. Left hip replacement      XR CHEST PORTABLE   Final Result   1. No evidence of acute cardiopulmonary disease. XR PELVIS (1-2 VIEWS)   Final Result      Acute left femoral neck fracture. LEFT FEMUR 2 VIEWS      INDICATION: Fall. Fracture. FINDINGS: AP and lateral views left femur were obtained. Note is again made of the left femoral neck fracture. No other fractures are identified. No dislocation is identified. IMPRESSION:   1. Acute left femoral neck fracture. XR FEMUR LEFT (MIN 2 VIEWS)   Final Result      Acute left femoral neck fracture. LEFT FEMUR 2 VIEWS      INDICATION: Fall. Fracture. FINDINGS: AP and lateral views left femur were obtained.  Note is again made of the left femoral neck fracture. No other fractures are identified. No dislocation is identified. IMPRESSION:   1. Acute left femoral neck fracture. CT Head WO Contrast   Final Result   1. Significant central and cortical atrophic changes   2. 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 No evidence of acute intracranial hemorrhage             Treatment team at time of Discharge: Treatment Team: Attending Provider: Bertha Myrick MD; Surgeon: Baldev Mckee DO; Registered Nurse: Lucy Alarcon RN; Nursing Student: Alan Kraft; : Cameron Kearney MSW, LSW; Nursing Student: Argelia Hurley; Registered Nurse: Lakshmi Coyle RN; Physical Therapist: Leola Reaves; Occupational Therapist: Brynn Randhawa OT; Utilization Reviewer: Ck Sales RN    Discharge Plan   Disposition: Home    Provider Follow-Up:   Baldev Mckee DO  [address] [place] [address] [phone]    In 2 weeks      PCP    In 2 weeks  follow up        Hospital/Incidental Findings Requiring Follow-Up:  XR HIP 2-3 VW W PELVIS LEFT   Final Result      1. Left hip replacement      XR CHEST PORTABLE   Final Result   1. No evidence of acute cardiopulmonary disease. XR PELVIS (1-2 VIEWS)   Final Result      Acute left femoral neck fracture. LEFT FEMUR 2 VIEWS      INDICATION: Fall. Fracture. FINDINGS: AP and lateral views left femur were obtained. Note is again made of the left femoral neck fracture. No other fractures are identified. No dislocation is identified. IMPRESSION:   1. Acute left femoral neck fracture. XR FEMUR LEFT (MIN 2 VIEWS)   Final Result      Acute left femoral neck fracture. LEFT FEMUR 2 VIEWS      INDICATION: Fall. Fracture. FINDINGS: AP and lateral views left femur were obtained. Note is again made of the left femoral neck fracture. No other fractures are identified. No dislocation is identified.       IMPRESSION: 1. Acute left femoral neck fracture. CT Head WO Contrast   Final Result   1. Significant central and cortical atrophic changes   2. 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 No evidence of acute intracranial hemorrhage                Discharge Medications         Medication List      START taking these medications    enoxaparin 30 MG/0.3ML injection  Commonly known as: LOVENOX  Inject 0.3 mLs into the skin 2 times daily for 28 days     naloxegol 12.5 MG Tabs tablet  Commonly known as: MOVANTIK  Take 1 tablet by mouth every morning for 7 days     oxyCODONE 5 MG immediate release tablet  Commonly known as: ROXICODONE  Take 1 tablet by mouth every 6 hours as needed for Pain for up to 5 days. polyethylene glycol 17 g packet  Commonly known as: GLYCOLAX  Take 17 g by mouth daily as needed for Constipation     sennosides-docusate sodium 8.6-50 MG tablet  Commonly known as: SENOKOT-S  Take 1 tablet by mouth 2 times daily for 7 days     traMADol 50 MG tablet  Commonly known as: ULTRAM  Take 1 tablet by mouth every 4 hours as needed for Pain for up to 3 days. CONTINUE taking these medications    aspirin 81 MG chewable tablet     atorvastatin 20 MG tablet  Commonly known as: LIPITOR     Claritin 10 MG capsule  Generic drug: loratadine     fluticasone 50 MCG/ACT nasal spray  Commonly known as: FLONASE     isosorbide mononitrate 30 MG extended release tablet  Commonly known as: IMDUR     metoprolol succinate 25 MG extended release tablet  Commonly known as: TOPROL XL           Where to Get Your Medications      These medications were [facility] to [facility], [facility] [address] Felder [phone] - F [phone]  [address] [facility] [address]    Phone: [phone]   · enoxaparin 30 MG/0.3ML injection  · naloxegol 12.5 MG Tabs tablet  · polyethylene glycol 17 g packet  · sennosides-docusate sodium 8.6-50 MG

## 2022-04-13 NOTE — CARE COORDINATION
ADDENDUM:   TWYLA spoke to Pt's granddaughter, they don't want any of the current accepting facilities. They are willing to pay for private duty at a SNF. She requested additional referrals to be sent to [facility] and [address] [place] [address]. Pt's granddaughter will also try contacting [facility]. Electronically signed by KUSUM Cruz LSW on 4/13/2022 at 4:00 PM      TWYLA LVM for admissions at [facility]  Electronically signed by KUSUM Cruz LSW on 4/13/2022 at 3:35 PM      The lodge denied but can accept at their sister facility [facility]. [facility] is reviewing. Electronically signed by KUSUM Cruz LSW on 4/13/2022 at 2:05 PM      [facility] has denied Pt. Electronically signed by KUSUM Cruz LSW on 4/13/2022 at 11:37 AM      TWYLA LVM again for Admissions at [facility]. TWYLA spoke to [facility] at [facility], she is reviewing with her DON due to Pt's memory care needs if they would be willing to accept for SNF if the family has a private duty RN in the facility with her. They also need to know Pt's vaccination status, SW will ask Pt's family and call them back. TWYLA spoke to Kera at [facility] at [facility], they will not have a bed available until next week. TWYLA spoke Osito at [facility], they are not in network w/[employer] medicare. TWYLA spoke to Demetris Winters at [facility], insurance out of network. TWYLA spoke to Luke, she will notify Floretta Hammans to review referral and call back. TWYLA spoke to ETHAN at [place], they can accept Pt. SW will inform Pt's family and follow up w/[facility]. TWYLA LVM for Marnie Driscoll at [facility]. TWYLA spoke to Rolando Tejada at [facility], they don't have a bed. SW LVM for Daly at [facility]. TWYLA following for DCP.     Electronically signed by KUSUM Cruz, LSW on 4/13/2022 at 9:48 AM  [phone]

## 2022-04-13 NOTE — DISCHARGE INSTR - COC
Continuity of Care Form    Patient Name: Alisson Simpson   :  1935  MRN:  [id number]    Admit date:  2022  Discharge date:  ***    Code Status Order: Full Code   Advance Directives:      Admitting Physician:  Patricia Self MD  PCP: PROVIDER Taty Sebastian MD    Discharging Nurse: [facility] Unit/Room#: 3342/9772-18  Discharging Unit Phone Number: ***    Emergency Contact:   Extended Emergency Contact Information  Primary Emergency Contact: Toan [address] Phone: [phone]  Relation: Spouse  Secondary Emergency Contact: Beth De La Torre, [address] Phone: [phone]  Relation: Grandchild    Past Surgical History:  Past Surgical History:   Procedure Laterality Date    HIP SURGERY Left 2022    LEFT HIP HEMIARTHROPLASTY performed by Steve Chan DO at [address]       Immunization History: There is no immunization history on file for this patient. Active Problems:  Patient Active Problem List   Diagnosis Code    Closed displaced fracture of left femoral neck (HCC) S72.002A       Isolation/Infection:   Isolation            No Isolation          Patient Infection Status       None to display            Nurse Assessment:  Last Vital Signs: BP (!) 155/74   Pulse 86   Temp 97.8 °F (36.6 °C) (Oral)   Resp 18   Ht 5' 2\" (1.575 m)   Wt 128 lb 12.8 oz (58.4 kg)   SpO2 96%   BMI 23.56 kg/m²     Last documented pain score (0-10 scale): Pain Level: 5  Last Weight:   Wt Readings from Last 1 Encounters:   22 128 lb 12.8 oz (58.4 kg)     Mental Status:  disoriented and alert    IV Access:  - None    Nursing Mobility/ADLs:  Walking   Assisted  Transfer  Assisted  Bathing  Assisted  Dressing  Assisted  Toileting  Assisted  Feeding  Independent  Med Admin  Assisted  Med Delivery   crushed and prefers mixed with applesauce     Wound Care Documentation and Therapy:        Elimination:  Continence:    Bowel: Yes  Bladder: Yes  Urinary Catheter: None   Colostomy/Ileostomy/Ileal Conduit: No       Date of Last BM: 4/14/22    Intake/Output Summary (Last 24 hours) at 4/13/2022 0838  Last data filed at 4/12/2022 1307  Gross per 24 hour   Intake 180 ml   Output --   Net 180 ml     I/O last 3 completed shifts: In: 180 [P.O.:170; I.V.:10]  Out: -     Safety Concerns:     Sundowners [place]rome and History of Falls (last 30 days)    Impairments/Disabilities:      None    Nutrition Therapy:  Current Nutrition Therapy:   - Oral Diet:  General    Routes of Feeding: Oral  Liquids: No Restrictions  Daily Fluid Restriction: no  Last Modified Barium Swallow with Video (Video Swallowing Test): not done    Treatments at the Time of Hospital Discharge:   Respiratory Treatments: ***  Oxygen Therapy:  is not on home oxygen therapy. Ventilator:    - No ventilator support    Rehab Therapies: Physical Therapy and Occupational Therapy  Weight Bearing Status/Restrictions: No weight bearing restrictions  Other Medical Equipment (for information only, NOT a DME order):  walker and nas stedy   Other Treatments: ***    Patient's personal belongings (please select all that are sent with patient):  Ry ACHARYA SIGNATURE:  Electronically signed by Hunter Valentin RN on 4/14/22 at 3:19 PM EDT    CASE MANAGEMENT/SOCIAL WORK SECTION    Inpatient Status Date: 04/07/2022    Readmission Risk Assessment Score:  Readmission Risk              Risk of Unplanned Readmission:  12           Discharging to Facility/ Agency   Name: [facility]   Address:  [facility]- [phone]  Fax:    Dialysis Facility (if applicable)   Name:  Address:  Dialysis Schedule:  Phone:  Fax:    / signature: Electronically signed by Candace Arroyo RN on 4/14/22 at 3:10 PM EDT    PHYSICIAN SECTION    Prognosis: Fair    Condition at Discharge: Stable    Rehab Potential (if transferring to Rehab):  Fair    Recommended Labs or Other Treatments After Discharge:     PT OT  Delirium precautions     Physician Certification: I certify the above information and transfer of Ingrid Mijares  is necessary for the continuing treatment of the diagnosis listed and that she requires [facility]   Update Admission H&P: No change in H&P    PHYSICIAN SIGNATURE:  Electronically signed by Noreen Grullon MD on 4/13/22 at 8:39 AM EDT

## 2022-04-13 NOTE — PROGRESS NOTES
Patient is alert to self only, ambulating to bathroom with nas stedy. Patient had one episode of emesis this shift, see mar for med administration, nausea resolved after administration. Patient has BM this shift after emesis. VSS. Surgical dressing clean dry and intact. Will continue to monitor. Family at bedside will continue to monitor.

## 2022-04-13 NOTE — PROGRESS NOTES
Pt alert to self only this shift, VSS other than elevated BP. Notified MD of bp and medicated appropriately per orders. Pt is incontinent and brief being changed frequently. Sandi care provided with each incontinence episode. Tolerating diet and fluids with no issues. Surgical dressing CDI with no drainage noted. Fall precautions in place. Pt in bed with call light in reach.

## 2022-04-14 VITALS
HEART RATE: 72 BPM | OXYGEN SATURATION: 96 % | SYSTOLIC BLOOD PRESSURE: 111 MMHG | TEMPERATURE: 97.6 F | BODY MASS INDEX: 23.7 KG/M2 | RESPIRATION RATE: 18 BRPM | DIASTOLIC BLOOD PRESSURE: 69 MMHG | HEIGHT: 62 IN | WEIGHT: 128.8 LBS

## 2022-04-14 LAB — SARS-COV-2, NAAT: NOT DETECTED

## 2022-04-14 PROCEDURE — 97530 THERAPEUTIC ACTIVITIES: CPT

## 2022-04-14 PROCEDURE — 6370000000 HC RX 637 (ALT 250 FOR IP): Performed by: ORTHOPAEDIC SURGERY

## 2022-04-14 PROCEDURE — 97116 GAIT TRAINING THERAPY: CPT

## 2022-04-14 PROCEDURE — 97535 SELF CARE MNGMENT TRAINING: CPT

## 2022-04-14 PROCEDURE — 97110 THERAPEUTIC EXERCISES: CPT

## 2022-04-14 PROCEDURE — 6370000000 HC RX 637 (ALT 250 FOR IP): Performed by: INTERNAL MEDICINE

## 2022-04-14 PROCEDURE — 87635 SARS-COV-2 COVID-19 AMP PRB: CPT

## 2022-04-14 PROCEDURE — 2580000003 HC RX 258: Performed by: ORTHOPAEDIC SURGERY

## 2022-04-14 PROCEDURE — 6360000002 HC RX W HCPCS: Performed by: ORTHOPAEDIC SURGERY

## 2022-04-14 RX ADMIN — OXYCODONE 5 MG: 5 TABLET ORAL at 14:28

## 2022-04-14 RX ADMIN — CETIRIZINE HYDROCHLORIDE 10 MG: 10 TABLET, FILM COATED ORAL at 09:30

## 2022-04-14 RX ADMIN — ACETAMINOPHEN 1000 MG: 500 TABLET ORAL at 14:28

## 2022-04-14 RX ADMIN — ISOSORBIDE MONONITRATE 30 MG: 30 TABLET, EXTENDED RELEASE ORAL at 09:29

## 2022-04-14 RX ADMIN — ACETAMINOPHEN 1000 MG: 500 TABLET ORAL at 06:08

## 2022-04-14 RX ADMIN — SENNOSIDES AND DOCUSATE SODIUM 1 TABLET: 50; 8.6 TABLET ORAL at 09:29

## 2022-04-14 RX ADMIN — OXYCODONE 10 MG: 5 TABLET ORAL at 06:09

## 2022-04-14 RX ADMIN — TRAMADOL HYDROCHLORIDE 50 MG: 50 TABLET, COATED ORAL at 03:28

## 2022-04-14 RX ADMIN — ATORVASTATIN CALCIUM 20 MG: 20 TABLET, FILM COATED ORAL at 09:30

## 2022-04-14 RX ADMIN — TRAMADOL HYDROCHLORIDE 50 MG: 50 TABLET, COATED ORAL at 17:02

## 2022-04-14 RX ADMIN — ENOXAPARIN SODIUM 30 MG: 100 INJECTION SUBCUTANEOUS at 09:30

## 2022-04-14 RX ADMIN — SODIUM CHLORIDE, PRESERVATIVE FREE 10 ML: 5 INJECTION INTRAVENOUS at 09:29

## 2022-04-14 RX ADMIN — METOPROLOL SUCCINATE 25 MG: 25 TABLET, EXTENDED RELEASE ORAL at 09:29

## 2022-04-14 RX ADMIN — NALOXEGOL OXALATE 12.5 MG: 12.5 TABLET, FILM COATED ORAL at 09:29

## 2022-04-14 ASSESSMENT — PAIN SCALES - GENERAL
PAINLEVEL_OUTOF10: 10
PAINLEVEL_OUTOF10: 7
PAINLEVEL_OUTOF10: 1
PAINLEVEL_OUTOF10: 10
PAINLEVEL_OUTOF10: 0

## 2022-04-14 ASSESSMENT — PAIN DESCRIPTION - PAIN TYPE
TYPE: SURGICAL PAIN
TYPE: SURGICAL PAIN

## 2022-04-14 ASSESSMENT — PAIN DESCRIPTION - DESCRIPTORS
DESCRIPTORS: ACHING
DESCRIPTORS: ACHING

## 2022-04-14 ASSESSMENT — PAIN DESCRIPTION - PROGRESSION
CLINICAL_PROGRESSION: NOT CHANGED
CLINICAL_PROGRESSION: NOT CHANGED

## 2022-04-14 ASSESSMENT — PAIN DESCRIPTION - ORIENTATION
ORIENTATION: LEFT
ORIENTATION: LEFT

## 2022-04-14 ASSESSMENT — PAIN DESCRIPTION - ONSET
ONSET: ON-GOING
ONSET: ON-GOING

## 2022-04-14 ASSESSMENT — PAIN DESCRIPTION - FREQUENCY
FREQUENCY: CONTINUOUS
FREQUENCY: CONTINUOUS

## 2022-04-14 ASSESSMENT — PAIN DESCRIPTION - LOCATION
LOCATION: HIP
LOCATION: HIP

## 2022-04-14 NOTE — CARE COORDINATION
Case Management Assessment            Discharge Note                    Date / Time of Note: 4/14/2022 4:10 PM                  Discharge Note Completed by: KUSUM Hood, RAFFIW    Patient Name: Esthela Thornton   YOB: 1935  Diagnosis: Closed displaced fracture of left femoral neck ([address] Narinder Grimes   Date / Time: 4/7/2022  2:50 PM    Current PCP: PROVIDER Mary Herzog MD  Clinic patient: No    Hospitalization in the last 30 days: No    Advance Directives:  Code Status: Full Code  [place] DNR form completed and on chart: No    Financial:  Payor: Herbert Núñez / Plan: [facility] PPO / Product Type: Medicare /      Pharmacy:    Mayen Malachi, [address] [place], [place] [id number] -  [phone]  [place] [address]  Phone: [phone] Fax: [phone] medications?: Potential Assistance Purchasing Medications: No  Assistance provided by Case Management: None at this time    Does patient want to participate in local refill/ meds to beds program?: No    Meds To Beds General Rules:  1. Can ONLY be done Monday- Friday between 8:30am-5pm  2. Prescription(s) must be in pharmacy by 3pm to be filled same day  3. Copy of patient's insurance/ prescription drug card and patient face sheet must be sent along with the prescription(s)  4. Cost of Rx cannot be added to hospital bill. If financial assistance is needed, please contact unit  or ;  or  CANNOT provide pharmacy voucher for patients co-pays  5.  Patients can then  the prescription on their way out of the hospital at discharge, or pharmacy can deliver to the bedside if staff is available. (payment due at time of pick-up or delivery - cash, check, or card accepted)     Able to afford home medications/ co-pay costs: Yes    ADLS:  Current PT AM-PAC Score: 11 /24  Current OT AM-PAC Score: 16 /24      DISCHARGE Disposition: [facility] (SNF): [facility] Phone: [phone] Fax: [phone]    LOC at discharge: Skilled  [address] Completed: Yes    Notification completed in HENS/PAS?:  Yes : CM has completed HENS online through secure website for SNF admission at [facility]. Document ID #: [id number]    IMM Completed:   Not Indicated    Transportation:  Transportation PLAN for discharge: EMS transportation   Mode of Transport: Ambulance stretcher - BLS  Reason for medical transport: Bed confined: Meets the following criteria 1) unable to get out of bed without assistance or ambulate, 2) unable to safely sit up in a wheelchair, 3) unable to maintain erect seating position in a chair for time needed for transport  Name of [address] [place] [address], [address]: [facility]  Phone: [phone]  Time of Transport: 5:30pm    Transport form completed: Yes    Home Care:  [address] ordered at discharge: No    Durable Medical Equipment:  DME Provider: none  Equipment obtained during hospitalization: none    Home Oxygen and Respiratory Equipment:  Oxygen needed at discharge?: No    Dialysis:  Dialysis patient: No    Referrals made at [facility] for outpatient continued care:  Not Applicable    Additional CM Notes:       SW met w/Pt and Pt's family at bedside. Pt's family is in agreement w/DC to [facility]. Pre-cert has been approved. TWYLA scheduled transport for 5:30 via [facility] [facility] ambulance. Pt has no other needs at this time. TWYLA confirmed admission w/anibal. Report: [phone]      The Plan for Transition of Care is related to the following treatment goals of Closed displaced fracture of left femoral neck ([facility] [address] [S72.002A]    The Patient and/or patient representative [address] and her family were provided with a choice of provider and agrees with the discharge plan Yes    Freedom of choice list was provided with basic dialogue that supports the patient's individualized plan of care/goals and shares the quality data associated with the providers. Yes    Care Transitions patient: No    KUSUM Light, [facility]  Case Management Department  Ph: [phone]

## 2022-04-14 NOTE — PROGRESS NOTES
Pt transported to [facility]. Belongings gathered. IV removed. Attempted to call report w no answer, call back number left with . Scripts in discharge packet.

## 2022-04-14 NOTE — PROGRESS NOTES
Progress Note        Date:2022       Room:5506/5506-01  Patient Jason Chavez     YOB: 1935     Age:87 y.o. Chief Complaint   Patient presents with    Hip Pain     Pt fell from standing, witnessed fall, no LOC (LEFT HIP)            Subjective   Interval History Status: improved. No overnight issues  Mild confused. Pending placement. Review of Systems   ROS as mentioned above. Medications   Scheduled Meds:    naloxegol  12.5 mg Oral QAM    enoxaparin  30 mg SubCUTAneous BID    acetaminophen  1,000 mg Oral 3 times per day    atorvastatin  20 mg Oral Daily    fluticasone  1 spray Each Nostril Daily    isosorbide mononitrate  30 mg Oral Daily    metoprolol succinate  25 mg Oral Daily    cetirizine  10 mg Oral Daily    sodium chloride flush  10 mL IntraVENous 2 times per day    sennosides-docusate sodium  1 tablet Oral BID     Continuous Infusions:    sodium chloride       PRN Meds: oxyCODONE, oxyCODONE, traMADol, sodium chloride flush, sodium chloride, promethazine **OR** ondansetron, polyethylene glycol    Past History    Past Medical History:   has no past medical history on file. Social History:   reports that she has never smoked. She has never used smokeless tobacco. She reports that she does not drink alcohol and does not use drugs. Family History: History reviewed. No pertinent family history. Physical Examination      Vitals:  /70   Pulse 71   Temp 98.1 °F (36.7 °C) (Oral)   Resp 18   Ht 5' 2\" (1.575 m)   Wt 128 lb 12.8 oz (58.4 kg)   SpO2 93%   BMI 23.56 kg/m²   Temp (24hrs), Av.1 °F (36.7 °C), Min:97.9 °F (36.6 °C), Max:98.2 °F (36.8 °C)      I/O (24Hr): Intake/Output Summary (Last 24 hours) at 2022 1202  Last data filed at 2022 0950  Gross per 24 hour   Intake 360 ml   Output 0 ml   Net 360 ml       Physical Exam  Constitutional:       Appearance: Normal appearance. HENT:      Head: Normocephalic. Cardiovascular:      Rate and Rhythm: Normal rate and regular rhythm. Pulses: Normal pulses. Heart sounds: Normal heart sounds. Pulmonary:      Effort: Pulmonary effort is normal.      Breath sounds: Normal breath sounds. Abdominal:      General: Abdomen is flat. Palpations: Abdomen is soft. Musculoskeletal:      Comments: Left hip dressing in place. No saturation with blood. Skin:     General: Skin is warm and dry. Capillary Refill: Capillary refill takes less than 2 seconds. Neurological:      General: No focal deficit present. Mental Status: She is alert. Mental status is at baseline. Assessment      Principal Problem:    Closed displaced fracture of left femoral neck (HCC)  Resolved Problems:    * No resolved hospital problems. *       Plan:          Plan:     #Traumatic left femoral neck fracture  Due to mechanical fall  Status post OR for repair 4/8  Analgesia as needed. Lovenox for DVT ppx  PT OT, placement. #History of dementia  Delirium precautions    Pending placement.        Daniel Vu MD  12:02 PM  04/14/22

## 2022-04-14 NOTE — PROGRESS NOTES
Occupational Therapy  Facility/Department: [facility] 5T ORTHO/NEURO  Daily Treatment Note  NAME: Nilsa Harris  : 1935  MRN: [id number]    Date of Service: 2022    Discharge Recommendations: Nilsa Harris scored a 16/24 on the AM-PAC ADL Inpatient form. Current research shows that an AM-PAC score of 17 or less is typically not associated with a discharge to the patient's home setting. Based on the patient's AM-PAC score and their current ADL deficits, it is recommended that the patient have 3-5 sessions per week of Occupational Therapy at d/c to increase the patient's independence. Please see assessment section for further patient specific details. If patient discharges prior to next session this note will serve as a discharge summary. Please see below for the latest assessment towards goals. OT Equipment Recommendations  Equipment Needed: No  Other: defer to next care facility    Assessment   Performance deficits / Impairments: Decreased functional mobility ; Decreased endurance;Decreased ADL status    Assessment: Pt demo increased participation this date. Pt completed functional mobility from EOB to [facility] and [facility] Pt initially requiring Mod Ax2, however progressing to [address] [place] [address]. Pt cont to require 2 person assist for sit>stand transfers. Pt remains limited by decreased balance, anxiety, confusion, pain, and decerased activity tolerance. Pt demo improved LE dressing this date, donning brief w/ Max A and use of reacher. Pt would benefit from cont inpt OT upon dc to maximize safety and funcitonal independence.  Will cont to follow    Treatment Diagnosis: impaired ADLs /functional mobility 2/2 L hip fx s/p repair  OT Education: OT Role;Plan of Care;Equipment;Precautions;Transfer Training;ADL Adaptive Strategies  Patient Education: No learning / no carryover noted - continued education and reinforcement  Barriers to Learning: cog  REQUIRES OT FOLLOW UP: Yes  Activity Tolerance  Activity Tolerance: Patient Tolerated treatment well;Treatment limited secondary to decreased cognition  Activity Tolerance: Pt requires max encouragement for participation  Safety Devices  Safety Devices in place: Yes  Type of devices: Left in chair;Nurse notified;Call light within reach; Chair alarm in place         Patient Diagnosis(es): The encounter diagnosis was Closed displaced fracture of left femoral neck ([address] has no past medical history on file. has a past surgical history that includes hip surgery (Left, 4/8/2022). Restrictions  Position Activity Restriction  Other position/activity restrictions: up as tolerated , WBAT LLE, hip precautions? - anterior/posterior? Subjective   General  Chart Reviewed: Yes  Additional Pertinent Hx: Admit 4/7 with fall + L femoral neck fracture on scans      ortho consult to OR 4/8 s/p L Hemiarthroplasty                                 PMHX: dementia, CAD,  Response to previous treatment: Patient with no complaints from previous session  Family / Caregiver Present: Yes ( and daughter)  Diagnosis: fall , L femoral neck fx  Subjective  Subjective: Pt in bed, agreeable to work with OT. General Comment  Comments: . Orientation     Objective    ADL  Feeding: Setup (pt setup w/ lunch at end of session)  LE Dressing: Maximum assistance (Pt donned brief w/ Max A and use of reacher. Pt required assist to complete pant mgmt in stance, however able to thread BLE into brief w/ assist to utilize reacher)  Toileting: Maximum assistance (pt simulated on BSC w/ Max A)  Additional Comments: Pt needing freq cues / reminders to stay on task.  Pt w/ self-limiting behaviors and requiring frequent encouragement        Balance  Sitting Balance: Contact guard assistance (seated unsupported EOB)  Standing Balance: Minimal assistance (Min Ax2 w/ RW during functional mobility, progressing to Min Ax1 at RW during functional mobility)  Standing Balance  Time: ~5min total  Activity: stance for transfer; functional mobility from EOb > [facility] and from [facility] > recliner  Functional Mobility  Functional - Mobility Device: Rolling Walker  Activity: Other  Assist Level: Dependent/Total  Functional Mobility Comments: Min Ax2 from EOB to [facility]; Min Ax1 from [facility] to recliner (however pt w/ increased anxiety and required encouragement)  Bed mobility  Supine to Sit: 2 Person assistance; Moderate assistance;Minimal assistance (HOB elevated)  Scooting: Maximal assistance (to EOB)  Transfers  Sit to stand: 2 Person assistance; Moderate assistance;Minimal assistance (Mod A x2 from EOB> RW; Min Ax2 from [employer])  Stand to sit: 2 Person assistance;Minimal assistance; Moderate assistance (Mod Ax2 from RW>BSC 2/2 increased anxiety; Min Ax1 from RW>reclienr)  Transfer Comments: Pt demo improved performance this date                       Cognition  Overall Cognitive Status: Exceptions  Arousal/Alertness: Appropriate responses to stimuli  Following Commands: Follows one step commands with repetition; Follows one step commands with increased time  Attention Span: Attends with cues to redirect; Difficulty attending to directions  Memory: Decreased short term memory;Decreased recall of recent events;Decreased recall of precautions;Decreased recall of biographical Information  Safety Judgement: Decreased awareness of need for safety;Decreased awareness of need for assistance  Problem Solving: Decreased awareness of errors  Insights: Decreased awareness of deficits  Initiation: Requires cues for all  Sequencing: Requires cues for all  Cognition Comment: Pt with moderate dementia - needing ongoing cues for tasks/ initiation and to follow precautions; self limiting at times with verbal cues for encouragement throughout                                         Plan   Plan  Times per week: 5-7x  Times per day: Daily  Current Treatment Recommendations: Endurance Training,Functional Mobility Training,Safety Education & Training,Self-Care / ADL,Equipment Evaluation, Education, & procurement,Patient/Caregiver Education & Training  G-Code     OutComes Score                                                  AM-PAC Score        AM-PAC Inpatient Daily Activity Raw Score: 16 (04/14/22 1356)  AM-PAC Inpatient ADL T-Scale Score : 35.96 (04/14/22 1356)  ADL Inpatient CMS 0-100% Score: 53.32 (04/14/22 1356)  ADL Inpatient CMS G-Code Modifier : CK (04/14/22 1356)    Goals  Short term goals  Time Frame for Short term goals: at d/c  Short term goal 1: Stance x 7 mins with SBA for ADLs /IADLs - continue  Short term goal 2: LE Dressing with Min A and AE prn - continue  Short term goal 3: Commode transfers with CGA - continue  Short term goal 4: Pt /family verb hip precautions s/p L tay arthoplasty - continue       Therapy Time   Individual Concurrent Group Co-treatment   Time In 1130         Time Out 1220         Minutes [address]

## 2022-04-14 NOTE — PROGRESS NOTES
Patient is alert and oriented to self only. Disoriented to place, time, and situation. Vital signs are stable. Dressing is clean, dry, and intact. Patient's pain is controlled with medication per MAR. Bed is in the lowest position. Bed alarm is activated. Call light is within reach. Will continue to monitor and reassess.

## 2022-04-14 NOTE — PLAN OF CARE
Problem: Falls - Risk of:  Goal: Will remain free from falls  Description: Will remain free from falls  4/14/2022 1046 by Eric Villanueva  Outcome: Ongoing  Pt is in bed with bed alarm on. Bed in lowest position. Family at bedside. Call light is within reach. Remains free of falls so far this shift. Will continue to monitor patient. Problem: Skin Integrity:  Goal: Absence of new skin breakdown  Description: Absence of new skin breakdown  Outcome: Ongoing  Pt does not have any new skin breakdown so far this shift. Pt is able to turn self in bed. Will continue to monitor skin integrity.

## 2022-04-14 NOTE — PLAN OF CARE
Problem: Falls - Risk of:  Goal: Will remain free from falls  Description: Will remain free from falls  4/13/2022 2352 by Raegan Hernandez RN  Outcome: Ongoing  Patient remains free from falls during this shift. Bed is in the lowest position and the bed and chair alarm is activated. Anti-slip socks are on. Call light is within reach. Will continue to monitor and reassess. Problem: Pain:  Goal: Pain level will decrease  Description: Pain level will decrease  Outcome: Ongoing   RN assesses pain using 0-10 scale. Patient understands how to rate pain using 0-10 scale. Pain is controled with medication per MAR. RN encourages patient to call out for breakthrough pain. Will continue to monitor and reassess.

## 2022-04-14 NOTE — CARE COORDINATION
SW spoke to Parish Nurse from [place], they are denying and dont have a bed available. Electronically signed by KUSUM Lai LSW on 4/14/2022 at 9:36 AM      SW spoke to Parish Nurse in admissions at [facility], they are unsure of the referral, they state they will call SW back. Electronically signed by KUSUM Lai LSW on 4/14/2022 at 9:25 AM      SW received a call from Parish Hutchinson at [facility], they can accept Pt and will start pre-cert. SW will confirm placement w/Pt's family.      Electronically signed by KUSUM Lai LSW on 4/14/2022 at 9:21 AM.  [phone]

## 2022-04-14 NOTE — PROGRESS NOTES
Pt is alert to self. VSS. Dressing to L hip is CDI. Up x2 with stedy, tolerating well. Pain is well managed with scheduled and PRN meds per MAR. Fall precautions in place. Will continue to monitor.

## 2022-04-14 NOTE — PROGRESS NOTES
Physical Therapy  Daily Treatment Note    Discharge Recommendations: Arnoldo Farrell scored a 11/24 on the AM-PAC short mobility form. Current research shows that an AM-PAC score of 17 or less is typically not associated with a discharge to the patient's home setting. Based on the patient's AM-PAC score and their current functional mobility deficits, it is recommended that the patient have 3-5 sessions per week of Physical Therapy at d/c to increase the patient's independence. Please see assessment section for further patient specific details. Assessment:  Pt gradually improving with mobility, but continues to need up to 2 person assist for safety, Still needing lots of cues throughout session, bus some carryover noted. Pt anxious and fearful of falling with transfers and taking steps, but willing to participate. She is currently functioning well below baseline for mobility s/p hip fx/repair. Would benefit from continued IP PT at D/C prior to going home. Plan is for SNF. Equipment Needs: Defer to next level of care    Chart Reviewed: Yes     Other position/activity restrictions: up as tolerated , WBAT LLE, hip precautions - anterior   Additional Pertinent Hx: Admit 4/7 s/p fall with c/o hip pain; (+) L femoral neck fx; to OR 4/8 for L hip hemiarthroplasty; PMHx: dementia      Diagnosis: L femoral neck fx   Treatment Diagnosis: impaired gait and transfers    Subjective: Pt in bed initially.  and daughter present. Pt agreeable to working with therapy. \"I really need to pee. \" Agreeable to sitting on bedside commode.      Pain: No c/o at rest. C/o discomfort L hip with activity (not rated)    Objective:    Exercises  10 reps B ankle pumps, SAQ, heel slides (min assist on L), hip abd/add (mod assist on L), quad sets    Bed mobility  Supine to sit: Dependent (Mod assist x 1 + Min assist x 1)  Scooting: Max assist to EOB    Transfers  Sit to stand: Dependent (Mod assist x 2) from bed to walker; Dependent (Min        Time Out  1218            Minutes  46            Timed Code Treatment Minutes: 46  Total Treatment Minutes: 46    Will continue per plan of care. If patient is discharged prior to next treatment, this note will serve as the discharge summary.     Byron Parnell #[id number]

## 2022-04-14 NOTE — PROGRESS NOTES
Progress Note        Date:2022       Room:5506/5506-01  Patient Huma Roman     YOB: 1935     Age:87 y.o. Chief Complaint   Patient presents with    Hip Pain     Pt fell from standing, witnessed fall, no LOC (LEFT HIP)            Subjective   Interval History Status: improved. No overnight issues  Mild confused. Review of Systems   ROS as mentioned above. Medications   Scheduled Meds:    naloxegol  12.5 mg Oral QAM    enoxaparin  30 mg SubCUTAneous BID    acetaminophen  1,000 mg Oral 3 times per day    atorvastatin  20 mg Oral Daily    fluticasone  1 spray Each Nostril Daily    isosorbide mononitrate  30 mg Oral Daily    metoprolol succinate  25 mg Oral Daily    cetirizine  10 mg Oral Daily    sodium chloride flush  10 mL IntraVENous 2 times per day    sennosides-docusate sodium  1 tablet Oral BID     Continuous Infusions:    sodium chloride       PRN Meds: oxyCODONE, oxyCODONE, traMADol, sodium chloride flush, sodium chloride, promethazine **OR** ondansetron, polyethylene glycol    Past History    Past Medical History:   has no past medical history on file. Social History:   reports that she has never smoked. She has never used smokeless tobacco. She reports that she does not drink alcohol and does not use drugs. Family History: History reviewed. No pertinent family history. Physical Examination      Vitals:  /70   Pulse 71   Temp 98.1 °F (36.7 °C) (Oral)   Resp 18   Ht 5' 2\" (1.575 m)   Wt 128 lb 12.8 oz (58.4 kg)   SpO2 93%   BMI 23.56 kg/m²   Temp (24hrs), Av.1 °F (36.7 °C), Min:97.9 °F (36.6 °C), Max:98.2 °F (36.8 °C)      I/O (24Hr): Intake/Output Summary (Last 24 hours) at 2022 1201  Last data filed at 2022 0950  Gross per 24 hour   Intake 360 ml   Output 0 ml   Net 360 ml       Physical Exam  Constitutional:       Appearance: Normal appearance. HENT:      Head: Normocephalic.    Cardiovascular:      Rate and Rhythm: Normal rate and regular rhythm. Pulses: Normal pulses. Heart sounds: Normal heart sounds. Pulmonary:      Effort: Pulmonary effort is normal.      Breath sounds: Normal breath sounds. Abdominal:      General: Abdomen is flat. Palpations: Abdomen is soft. Musculoskeletal:      Comments: Left hip dressing in place. No saturation with blood. Skin:     General: Skin is warm and dry. Capillary Refill: Capillary refill takes less than 2 seconds. Neurological:      General: No focal deficit present. Mental Status: She is alert. Mental status is at baseline. Assessment      Principal Problem:    Closed displaced fracture of left femoral neck (HCC)  Resolved Problems:    * No resolved hospital problems. *       Plan:          Plan:     #Traumatic left femoral neck fracture  Due to mechanical fall  Status post OR for repair 4/8  Analgesia as needed. Lovenox for DVT ppx  PT OT, placement. Discussed with . #History of dementia  Delirium precautions    Pending placement.        Víctor Rosas MD  12:01 PM  04/14/22

## 2022-05-05 ENCOUNTER — HOSPITAL ENCOUNTER (EMERGENCY)
Age: 87
Discharge: HOME | End: 2022-05-06
Attending: EMERGENCY MEDICINE
Payer: MEDICARE

## 2022-05-05 DIAGNOSIS — K59.00 CONSTIPATION, UNSPECIFIED CONSTIPATION TYPE: ICD-10-CM

## 2022-05-05 DIAGNOSIS — R10.32 ABDOMINAL PAIN, LEFT LOWER QUADRANT: Primary | ICD-10-CM

## 2022-05-05 PROCEDURE — 99285 EMERGENCY DEPT VISIT HI MDM: CPT

## 2022-05-05 ASSESSMENT — PAIN - FUNCTIONAL ASSESSMENT
PAIN_FUNCTIONAL_ASSESSMENT: 0-10
PAIN_FUNCTIONAL_ASSESSMENT: PREVENTS OR INTERFERES WITH MANY ACTIVE NOT PASSIVE ACTIVITIES

## 2022-05-05 ASSESSMENT — PAIN DESCRIPTION - ONSET: ONSET: PROGRESSIVE

## 2022-05-05 ASSESSMENT — PAIN DESCRIPTION - LOCATION: LOCATION: HIP

## 2022-05-05 ASSESSMENT — PAIN DESCRIPTION - ORIENTATION: ORIENTATION: LEFT

## 2022-05-05 ASSESSMENT — PAIN DESCRIPTION - FREQUENCY: FREQUENCY: INTERMITTENT

## 2022-05-05 ASSESSMENT — PAIN DESCRIPTION - PAIN TYPE: TYPE: ACUTE PAIN

## 2022-05-05 ASSESSMENT — PAIN DESCRIPTION - DESCRIPTORS: DESCRIPTORS: ACHING

## 2022-05-05 ASSESSMENT — PAIN SCALES - GENERAL: PAINLEVEL_OUTOF10: 10

## 2022-05-06 ENCOUNTER — APPOINTMENT (OUTPATIENT)
Dept: CT IMAGING | Age: 87
End: 2022-05-06
Payer: MEDICARE

## 2022-05-06 ENCOUNTER — APPOINTMENT (OUTPATIENT)
Dept: GENERAL RADIOLOGY | Age: 87
End: 2022-05-06
Payer: MEDICARE

## 2022-05-06 VITALS
RESPIRATION RATE: 18 BRPM | WEIGHT: 124 LBS | OXYGEN SATURATION: 97 % | SYSTOLIC BLOOD PRESSURE: 135 MMHG | TEMPERATURE: 97.7 F | DIASTOLIC BLOOD PRESSURE: 76 MMHG | HEIGHT: 62 IN | BODY MASS INDEX: 22.82 KG/M2 | HEART RATE: 85 BPM

## 2022-05-06 LAB
ANION GAP SERPL CALCULATED.3IONS-SCNC: 13 MMOL/L (ref 3–16)
BACTERIA: ABNORMAL /HPF
BASOPHILS ABSOLUTE: 0 K/UL (ref 0–0.2)
BASOPHILS RELATIVE PERCENT: 0.4 %
BILIRUBIN URINE: NEGATIVE
BLOOD, URINE: NEGATIVE
BUN BLDV-MCNC: 19 MG/DL (ref 7–20)
CALCIUM SERPL-MCNC: 9.6 MG/DL (ref 8.3–10.6)
CHLORIDE BLD-SCNC: 106 MMOL/L (ref 99–110)
CLARITY: CLEAR
CO2: 20 MMOL/L (ref 21–32)
COLOR: YELLOW
CREAT SERPL-MCNC: 0.6 MG/DL (ref 0.6–1.2)
EOSINOPHILS ABSOLUTE: 0.3 K/UL (ref 0–0.6)
EOSINOPHILS RELATIVE PERCENT: 5 %
EPITHELIAL CELLS, UA: ABNORMAL /HPF (ref 0–5)
GFR AFRICAN AMERICAN: >60
GFR NON-AFRICAN AMERICAN: >60
GLUCOSE BLD-MCNC: 97 MG/DL (ref 70–99)
GLUCOSE URINE: NEGATIVE MG/DL
HCT VFR BLD CALC: 35.8 % (ref 36–48)
HEMOGLOBIN: 12 G/DL (ref 12–16)
KETONES, URINE: NEGATIVE MG/DL
LEUKOCYTE ESTERASE, URINE: NEGATIVE
LYMPHOCYTES ABSOLUTE: 2.3 K/UL (ref 1–5.1)
LYMPHOCYTES RELATIVE PERCENT: 34.3 %
MCH RBC QN AUTO: 31.6 PG (ref 26–34)
MCHC RBC AUTO-ENTMCNC: 33.5 G/DL (ref 31–36)
MCV RBC AUTO: 94.3 FL (ref 80–100)
MICROSCOPIC EXAMINATION: ABNORMAL
MONOCYTES ABSOLUTE: 1 K/UL (ref 0–1.3)
MONOCYTES RELATIVE PERCENT: 14.7 %
NEUTROPHILS ABSOLUTE: 3 K/UL (ref 1.7–7.7)
NEUTROPHILS RELATIVE PERCENT: 45.6 %
NITRITE, URINE: NEGATIVE
PDW BLD-RTO: 13.7 % (ref 12.4–15.4)
PH UA: 6 (ref 5–8)
PLATELET # BLD: 237 K/UL (ref 135–450)
PMV BLD AUTO: 8.6 FL (ref 5–10.5)
POTASSIUM REFLEX MAGNESIUM: 4 MMOL/L (ref 3.5–5.1)
PROTEIN UA: NEGATIVE MG/DL
RBC # BLD: 3.8 M/UL (ref 4–5.2)
RBC UA: ABNORMAL /HPF (ref 0–4)
SODIUM BLD-SCNC: 139 MMOL/L (ref 136–145)
SPECIFIC GRAVITY UA: <=1.005 (ref 1–1.03)
URINE TYPE: ABNORMAL
UROBILINOGEN, URINE: 0.2 E.U./DL
WBC # BLD: 6.6 K/UL (ref 4–11)
WBC UA: ABNORMAL /HPF (ref 0–5)

## 2022-05-06 PROCEDURE — 6370000000 HC RX 637 (ALT 250 FOR IP): Performed by: EMERGENCY MEDICINE

## 2022-05-06 PROCEDURE — 6360000004 HC RX CONTRAST MEDICATION: Performed by: EMERGENCY MEDICINE

## 2022-05-06 PROCEDURE — 85025 COMPLETE CBC W/AUTO DIFF WBC: CPT

## 2022-05-06 PROCEDURE — 73560 X-RAY EXAM OF KNEE 1 OR 2: CPT

## 2022-05-06 PROCEDURE — 74177 CT ABD & PELVIS W/CONTRAST: CPT

## 2022-05-06 PROCEDURE — 2580000003 HC RX 258: Performed by: EMERGENCY MEDICINE

## 2022-05-06 PROCEDURE — 80048 BASIC METABOLIC PNL TOTAL CA: CPT

## 2022-05-06 PROCEDURE — 81001 URINALYSIS AUTO W/SCOPE: CPT

## 2022-05-06 PROCEDURE — 73502 X-RAY EXAM HIP UNI 2-3 VIEWS: CPT

## 2022-05-06 RX ORDER — 0.9 % SODIUM CHLORIDE 0.9 %
500 INTRAVENOUS SOLUTION INTRAVENOUS ONCE
Status: COMPLETED | OUTPATIENT
Start: 2022-05-06 | End: 2022-05-06

## 2022-05-06 RX ORDER — ACETAMINOPHEN 325 MG/1
650 TABLET ORAL ONCE
Status: COMPLETED | OUTPATIENT
Start: 2022-05-06 | End: 2022-05-06

## 2022-05-06 RX ORDER — POLYETHYLENE GLYCOL 3350 17 G/17G
17 POWDER, FOR SOLUTION ORAL DAILY
Qty: 510 G | Refills: 0 | Status: SHIPPED | OUTPATIENT
Start: 2022-05-06 | End: 2022-06-05

## 2022-05-06 RX ADMIN — ACETAMINOPHEN 650 MG: 325 TABLET ORAL at 01:48

## 2022-05-06 RX ADMIN — SODIUM CHLORIDE 500 ML: 9 INJECTION, SOLUTION INTRAVENOUS at 00:54

## 2022-05-06 RX ADMIN — IOPAMIDOL 80 ML: 755 INJECTION, SOLUTION INTRAVENOUS at 01:37

## 2022-05-06 ASSESSMENT — ENCOUNTER SYMPTOMS
SINUS PAIN: 0
BACK PAIN: 0
WHEEZING: 0
SHORTNESS OF BREATH: 0
STRIDOR: 0
ABDOMINAL PAIN: 0
SORE THROAT: 0
DIARRHEA: 0
SINUS PRESSURE: 0
RHINORRHEA: 1
ABDOMINAL DISTENTION: 0
CONSTIPATION: 0

## 2022-05-06 ASSESSMENT — PAIN SCALES - GENERAL: PAINLEVEL_OUTOF10: 5

## 2022-05-06 NOTE — ED PROVIDER NOTES
ED Attending Attestation Note     Date of evaluation: 5/5/2022    This patient was seen by the resident. I have seen and examined the patient, agree with the workup, evaluation, management and diagnosis. The care plan has been discussed. My assessment reveals an 60-year-old female who presents with chief complaint of hip pain/abdominal pain. Patient broke her hip last month, was rehabbing and then came home recently. Was doing well. According to the  woke up in the middle of the night grabbing at her stomach/lower abdomen. Patient states she now feels better, but does have some very mild tenderness to palpation in her suprapubic region, full range of motion of hip. Incision is well-healing. Addendum: I was asked to follow-up on labs, CT by resident who went off service. These revealed normal labs, negative urinalysis. CT with significant amount of constipation as well as left superficial fluid collection that is likely a postop seroma. Patient clinically does not appear to have an infection or bursitis. Able to range the hip fully, no erythema or induration of the wound, no tenderness to palpation of surgical wound, no drainage both here on my exam and per the  who has been helping take care of the patient for the last several days. I spoke with Dr Carl Yepez who is on-call for orthopedic surgery who agreed that given the good clinical exam the patient does not need to be admitted for drainage at this time. He does request that the patient see Dr. Ktahie Hopkins in the office as an outpatient in the next few days for reassessment.  is aware of this. Cause of pain tonight was likely constipation seen on CT. Patient discharged home in good condition with MiraLAX.      Clinical impression:  Constipation    Plan: Discharge home     Criselda Wakefield MD  05/06/22 [id number]

## 2022-05-06 NOTE — ED PROVIDER NOTES
[facility]  EMERGENCY DEPARTMENT ENCOUNTER          [facility] RESIDENT NOTE       Date of evaluation: 5/5/2022    Chief Complaint     Hip Pain    History of Present Illness     Flaco Soria is a 81 y.o. female who presents L hip pain. Patient has hx of dementia and has difficulty providing hx though patient alert and oriented X 2.  at bedside to provide collateral hx.  reports patient fell to sleep at approximately 2100. Patient then woke up at 2300 holding her lower abdomen and calling out in pain.  reports that is was the worst pain he has seen patient in.  called EMS due to new onset pain and hx of recent L hip hemiarthroplasty. Patient continued to be in pain when EMS arrived. Patient was recently admitted from 4/7-4/13 for traumatic left femoral neck fracture after mechanical fall. Patient s/p L hip hemiarthroplasty w/ Dr. Liliana Sin on 4/8. Patient recently discharged one week ago from acute rehab. Upon arrival to ED patient reported pain felt better. Patient is an unreliable historian due to dementia and cannot remember having surgery one month ago. Review of Systems     Review of Systems   Constitutional: Negative for chills, diaphoresis, fatigue and fever. HENT: Positive for rhinorrhea. Negative for congestion, sinus pressure, sinus pain, sneezing and sore throat. Respiratory: Negative for shortness of breath, wheezing and stridor. Cardiovascular: Negative for chest pain and palpitations. Gastrointestinal: Negative for abdominal distention, abdominal pain, constipation and diarrhea. Genitourinary: Negative for decreased urine volume, dysuria and pelvic pain. Musculoskeletal: Negative for back pain, joint swelling and myalgias. Neurological: Negative for tremors, seizures, syncope, speech difficulty and numbness. Psychiatric/Behavioral: Negative for agitation, behavioral problems and confusion.        Past Medical, Surgical, Family, and Social History She has no past medical history on file. She has a past surgical history that includes hip surgery (Left, 4/8/2022). Her family history is not on file. She reports that she has never smoked. She has never used smokeless tobacco. She reports current alcohol use. She reports that she does not use drugs. Medications     Previous Medications    ASPIRIN 81 MG CHEWABLE TABLET    Take 81 mg by mouth daily    ATORVASTATIN (LIPITOR) 20 MG TABLET    Take 20 mg by mouth daily    ENOXAPARIN (LOVENOX) 30 MG/0.3ML INJECTION    Inject 0.3 mLs into the skin 2 times daily for 28 days    FLUTICASONE (FLONASE) 50 MCG/ACT NASAL SPRAY    1 spray by Each Nostril route daily    ISOSORBIDE MONONITRATE (IMDUR) 30 MG EXTENDED RELEASE TABLET    Take 30 mg by mouth daily    LORATADINE (CLARITIN) 10 MG CAPSULE    Take 10 mg by mouth daily    METOPROLOL SUCCINATE (TOPROL XL) 25 MG EXTENDED RELEASE TABLET    Take 25 mg by mouth daily 1/2 tab daily    POLYETHYLENE GLYCOL (GLYCOLAX) 17 G PACKET    Take 17 g by mouth daily as needed for Constipation       Allergies     She has No Known Allergies. Physical Exam     INITIAL VITALS: BP: (!) 194/77, Temp: 97.7 °F (36.5 °C), Pulse: 85, Resp: 18, SpO2: 98 %   Physical Exam  Constitutional:       Appearance: Normal appearance. She is normal weight. HENT:      Head: Normocephalic and atraumatic. Nose: No congestion or rhinorrhea. Mouth/Throat:      Pharynx: Oropharynx is clear. Cardiovascular:      Rate and Rhythm: Normal rate and regular rhythm. Pulses: Normal pulses. Heart sounds: Normal heart sounds. Pulmonary:      Effort: Pulmonary effort is normal.      Breath sounds: Normal breath sounds. Abdominal:      General: Abdomen is flat. Palpations: Abdomen is soft. Tenderness: There is no guarding. Comments: Suprapubic pain to deep palpation   Musculoskeletal:         General: No swelling, tenderness or signs of injury.       Comments: Scar w/ no erythema, tenderness, warmth, and fluctuation. No pain to left hip joint w/ palpation. Some pain to hip flection against resistance   Skin:     Findings: No bruising or erythema. Neurological:      Mental Status: She is alert and oriented to person, place, and time. Mental status is at baseline. Comments: Patient difficulty w/ recall of recent history   Psychiatric:         Mood and Affect: Mood normal.         Behavior: Behavior normal.         Diagnostic Results     RADIOLOGY:  No orders to display     LABS:   No results found for this visit on 05/05/22. RECENT VITALS:  BP: (!) 194/77, Temp: 97.7 °F (36.5 °C),Pulse: 85, Resp: 18, SpO2: 98 %     Procedures       ED Course     Nursing Notes, Past Medical Hx, Past Surgical Hx, Social Hx, Allergies, and FamilyHx were reviewed. The patient was giventhe following medications:  No orders of the defined types were placed in this encounter. CONSULTS:  None    MEDICAL DECISION MAKING / ASSESSMENT / Pradeep Plsarah is a 81 y.o. female w/ PMH of left hip hemiarthroplasty on 4/8 for traumatic left femoral neck fracture after mechanical fall. Patient presented to ED after having acute onset lower abdominal pain vs hip pain which resolved after arrival to ED. Patient poor historian due to dementia and  in room to provide collateral history. On presentation patient hypertensive w/ BP of 194/77, afebrile, and otherwise hemodynamically stable. Patient's exam is benign w/ surgical incision non erythematous, w/ no discharge and fluctuance. Left hip joint has no pain to palpation. Abdomenal exam showed suprapubic pain to deep palpation. No rigidity or guarding. Due to patient's age and dementia w/ complete thorough workup including BMP, CBC, urinalysis, CT abdomen and Pelvis w/ IV contrast, and XR of L hip. This patient was also evaluated by the attending physician. All care plans were discussed and agreed upon.  Attending physician will follow up labs and imaging. Clinical Impression     Hip pain  Abdominal Pain    Disposition     PATIENT REFERRED TO:  No follow-up provider specified.     DISCHARGE MEDICATIONS:  New Prescriptions    No medications on file       DISPOSITION     Pending reassessment      Betito Laguna MD  Resident  05/06/22 [id number]

## 2022-05-17 LAB
EKG ATRIAL RATE: 77 BPM
EKG DIAGNOSIS: NORMAL
EKG P AXIS: 72 DEGREES
EKG P-R INTERVAL: 174 MS
EKG Q-T INTERVAL: 432 MS
EKG QRS DURATION: 74 MS
EKG QTC CALCULATION (BAZETT): 488 MS
EKG R AXIS: -31 DEGREES
EKG T AXIS: 59 DEGREES
EKG VENTRICULAR RATE: 77 BPM

## 2024-08-18 ENCOUNTER — APPOINTMENT (OUTPATIENT)
Dept: CT IMAGING | Age: 89
End: 2024-08-18
Payer: MEDICARE

## 2024-08-18 ENCOUNTER — APPOINTMENT (OUTPATIENT)
Dept: GENERAL RADIOLOGY | Age: 89
End: 2024-08-18
Payer: MEDICARE

## 2024-08-18 ENCOUNTER — HOSPITAL ENCOUNTER (EMERGENCY)
Age: 89
Discharge: HOME | End: 2024-08-18
Attending: EMERGENCY MEDICINE
Payer: MEDICARE

## 2024-08-18 VITALS
SYSTOLIC BLOOD PRESSURE: 164 MMHG | HEIGHT: 60 IN | OXYGEN SATURATION: 99 % | TEMPERATURE: 97.7 F | WEIGHT: 142 LBS | DIASTOLIC BLOOD PRESSURE: 106 MMHG | BODY MASS INDEX: 27.88 KG/M2 | HEART RATE: 62 BPM | RESPIRATION RATE: 12 BRPM

## 2024-08-18 DIAGNOSIS — Z00.8 ENCOUNTER FOR MEDICAL ASSESSMENT: Primary | ICD-10-CM

## 2024-08-18 PROCEDURE — 71045 X-RAY EXAM CHEST 1 VIEW: CPT

## 2024-08-18 PROCEDURE — 99283 EMERGENCY DEPT VISIT LOW MDM: CPT

## 2024-08-18 PROCEDURE — 73560 X-RAY EXAM OF KNEE 1 OR 2: CPT

## 2024-08-18 PROCEDURE — 72170 X-RAY EXAM OF PELVIS: CPT

## 2024-08-18 ASSESSMENT — LIFESTYLE VARIABLES
HOW OFTEN DO YOU HAVE A DRINK CONTAINING ALCOHOL: NEVER
HOW MANY STANDARD DRINKS CONTAINING ALCOHOL DO YOU HAVE ON A TYPICAL DAY: PATIENT DOES NOT DRINK

## 2024-08-18 ASSESSMENT — PAIN - FUNCTIONAL ASSESSMENT: PAIN_FUNCTIONAL_ASSESSMENT: NONE - DENIES PAIN

## 2024-08-18 NOTE — DISCHARGE INSTRUCTIONS
Called patient to convey normal mammogram results. Radiologist recommends continuation of routine screenings.   Family opted to obtain x-rays, denied the CAT scan imaging which is reasonable.  X-rays look reassuring here today.    Continue all medications prescribed to patient at [facility].

## 2024-08-18 NOTE — ED PROVIDER NOTES
ED Attending Attestation Note     Date of evaluation: 8/18/2024    This patient was seen by the ISAURO.  I have seen and examined the patient, agree with the workup, evaluation, management and diagnosis. The care plan has been discussed. I was present for any procedures performed in the ISAURO's note and have made edits to the note where appropriate.    My assessment reveals 89 y.o. female with history of severe dementia presenting from her nursing home for being found on the side of her bed/floor kneeling.  Unclear if she fell, does not ambulate at baseline or typically attempt to stand on her own.  She does have some right hip and femur tenderness without gross deformity.  She is neurovascularly intact distally.  In discussion with family including her  who is her POA, they have elected to defer any CT imaging.  We have obtained x-rays of the right leg and hip which do not show any acute injuries.  She does have a nonspecific opacity on her lung but no symptoms consistent with pneumonia or other acute process.  She will be discharged back to her nursing facility and family are in agreement with this plan.        Ambrocio Jaramillo MD  08/18/24 1028

## 2024-08-18 NOTE — ED TRIAGE NOTES
Patient is alert, calm, becomes anxious/loud with acute stimulation but is reoriented/calms easily.   Purewick placed for hygiene. Depends in place. Denies pain. EMS PIV present on arrival. No focal deficits

## 2024-08-18 NOTE — ED PROVIDER NOTES
[facility]  EMERGENCY DEPARTMENT ENCOUNTER          PHYSICIAN ASSISTANT NOTE     Date of evaluation: 8/18/2024    Chief Complaint     possible fall (Found at [facility] on side of bed/floor in praying position, they fear she may have fallen. Patient with severe dementia at baseline. )    History of Present Illness     Serena Simpson is a 89 y.o. female who presents to the emergency department with a chief complaint of possible fall.  She resides at a [facility] facility in which they found her just prior to arrival sitting on the side of the bed in a praying position.  The facility feels like she may have fallen out of her bed.  The patient at baseline according to family at the bedside (granddaughter and  via phone POA) states that the patient is wheelchair-bound at baseline.  She transfers very closely from the wheelchair to the bed but otherwise does not ambulate at all.  According to family the patient has not complained of anything.  She tells them that she does not have any pain.  She has severe dementia and according to family appears to be at her baseline mental status.  When I talked to the patient she does not remember anything that occurred this morning.  She does not otherwise provide very much to the history of present illness. No anticoagulation.     ASSESSMENT / PLAN  (MEDICAL DECISION MAKING)     INITIAL VITALS: BP: (!) 165/75, Temp: 97.7 °F (36.5 °C), Pulse: 68, Respirations: 20, SpO2: 95 %    Serena Simpson is a 89 y.o. female who presents with a chief complaint of possible fall.  The patient has some mild hypertension otherwise normal and stable vital signs.  Lungs are clear to auscultation bilaterally.  Abdomen is soft and nontender to palpation.  There is no obvious external signs of trauma to the patient's head, chest wall or abdominal wall. The patient moves all extremities spontaneously.  She does have some mild tenderness to palpation of the left anterior knee, left

## 2024-08-26 ENCOUNTER — APPOINTMENT (OUTPATIENT)
Dept: GENERAL RADIOLOGY | Age: 89
End: 2024-08-26
Payer: MEDICARE

## 2024-08-26 ENCOUNTER — HOSPITAL ENCOUNTER (EMERGENCY)
Age: 89
Discharge: HOME | End: 2024-08-26
Attending: EMERGENCY MEDICINE
Payer: MEDICARE

## 2024-08-26 ENCOUNTER — APPOINTMENT (OUTPATIENT)
Dept: CT IMAGING | Age: 89
End: 2024-08-26
Payer: MEDICARE

## 2024-08-26 VITALS
HEIGHT: 60 IN | HEART RATE: 80 BPM | SYSTOLIC BLOOD PRESSURE: 147 MMHG | OXYGEN SATURATION: 98 % | WEIGHT: 139.5 LBS | BODY MASS INDEX: 27.39 KG/M2 | DIASTOLIC BLOOD PRESSURE: 62 MMHG | RESPIRATION RATE: 18 BRPM | TEMPERATURE: 98.5 F

## 2024-08-26 DIAGNOSIS — M79.89 SWELLING OF LOWER EXTREMITY: ICD-10-CM
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
## 2024-08-26 LAB
EKG ATRIAL RATE: 78 BPM
EKG DIAGNOSIS: NORMAL
EKG P AXIS: 57 DEGREES
EKG P-R INTERVAL: 168 MS
EKG Q-T INTERVAL: 406 MS
EKG QRS DURATION: 70 MS
EKG QTC CALCULATION (BAZETT): 462 MS
EKG R AXIS: -17 DEGREES
EKG T AXIS: 54 DEGREES
EKG VENTRICULAR RATE: 78 BPM

## 2024-08-26 PROCEDURE — 72170 X-RAY EXAM OF PELVIS: CPT

## 2024-08-26 PROCEDURE — 73552 X-RAY EXAM OF FEMUR 2/>: CPT

## 2024-08-26 PROCEDURE — 93005 ELECTROCARDIOGRAM TRACING: CPT | Performed by: EMERGENCY MEDICINE

## 2024-08-26 PROCEDURE — 99284 EMERGENCY DEPT VISIT MOD MDM: CPT

## 2024-08-26 PROCEDURE — 71045 X-RAY EXAM CHEST 1 VIEW: CPT

## 2024-08-26 RX ORDER — FUROSEMIDE 40 MG
40 TABLET ORAL DAILY
Qty: 5 TABLET | Refills: 0 | Status: SHIPPED | OUTPATIENT
Start: 2024-08-26 | End: 2024-08-31

## 2024-08-26 ASSESSMENT — PAIN - FUNCTIONAL ASSESSMENT: PAIN_FUNCTIONAL_ASSESSMENT: NONE - DENIES PAIN

## 2024-08-26 NOTE — ED PROVIDER NOTES
ED Attending Attestation Note     Date of evaluation: 8/26/2024    This patient was seen by the advance practice provider.  I have seen and examined the patient, agree with the workup, evaluation, management and diagnosis. The care plan has been discussed.  My assessment reveals patient here after a fall at her memory care unit.  Patient was found on the floor by her bed.  Family deferred any medical kind of testing at this time but did agree with x-ray imaging but no CT.  Imaging was negative for any bony injury.  Patient does have some pain in her feet secondary to edema and has been intermittently on Lasix in the past and family would like her to trial another round of Lasix to see if some of the swelling can be decreased so her feet are more comfortable.  A 5-day course of 40 mg daily Lasix was prescribed.     Abdiaziz Ann MD  08/26/24 [id number]

## 2024-08-26 NOTE — ED PROVIDER NOTES
[facility]  EMERGENCY DEPARTMENT ENCOUNTER          PHYSICIAN ASSISTANT NOTE       Date of evaluation: 8/26/2024    Chief Complaint     Fall (Patient with arrived to ED for reports of a unwitnessed fall that she sustained last night. Patient denies any pain at this time. Patient with Hx 2 previous hip fractures)      History of Present Illness     Serena Simpson is a 89 y.o. female with a PMH of dementia, presents due to a possible fall at [facility], [facility] care [facility]. Patient was here due to the same complaint on 8/18. Patient is alert but oriented to self only. Collateral history from nursing at [facility] states patient was found on the floor beside her bed. Nursing staff states they found new bruising on patients left leg. She has a history of bilateral hip replacements. Patient is prone to UTI's.   Patient denies any current pain when speaking with her.    Patient's  and granddaughter are in the room with her and state that patient's mentation is baseline and do not believe she has a UTI and would not like any blood work or urinalysis on the patient. The family also refuse a head CT as patient likely slid down from the side of the bed as she is non-weightbearing and the family has seen her do this before.   Family agree to left hip and femur x-rays as well as a chest xray for possible infection.     ASSESSMENT / PLAN  (MEDICAL DECISION MAKING)     INITIAL VITALS: BP: (!) 147/62, Temp: 98.5 °F (36.9 °C), Pulse: 80, Respirations: 18, SpO2: 98 %    Serena Simpson is a 89 y.o. female with a PMH of dementia, presents due to a possible fall at [facility], [facility] care [facility]. Patient was here due to the same complaint on 8/18. Patient is alert but oriented to self only. Collateral history from nursing at [facility] states patient was found on the floor beside her bed. Nursing staff states they found new bruising on patients left leg. She has a history of bilateral hip

## 2024-08-26 NOTE — PROGRESS NOTES
Patient and patient family refuse blood test, COVID test, urine test and CT. They just want Xray.   PA notified.

## 2024-08-26 NOTE — DISCHARGE INSTRUCTIONS
- Patient was seen in the emergency department due to a fall.  X-rays were negative.  Due to bilateral lower extremity swelling, patient was placed on Lasix x 5 days.  I do recommend that you follow-up with your primary care doctor for further evaluation and management.    -Return to the emergency department with chest pain, shortness of breath, additional falls, or other concerning symptoms.

## (undated) DEVICE — COAXIAL HIGH FLOW TIP WITH SOFT SHIELD

## (undated) DEVICE — SOLUTION IRRIG 3000ML 0.9% SOD CHL USP UROMATIC PLAS CONT

## (undated) DEVICE — 2108 SERIES SAGITTAL BLADE (20.7 X 0.88 X 85.0MM)

## (undated) DEVICE — COVER LT HNDL BLU PLAS

## (undated) DEVICE — SUTURE MCRYL SZ 3-0 L27IN ABSRB UD PS-2 3/8 CIR REV CUT NDL MCP427H

## (undated) DEVICE — BANDAGE COBAN 6 IN WND 6INX5YD FOAM

## (undated) DEVICE — GLOVE SURG SZ 8 L12IN FNGR THK87MIL DK GRN LTX POLYMER W

## (undated) DEVICE — SUTURE VCRL SZ 0 L27IN ABSRB UD L36MM CT-1 1/2 CIR J260H

## (undated) DEVICE — TOTAL HIP: Brand: MEDLINE INDUSTRIES, INC.

## (undated) DEVICE — DUAL CUT SAGITTAL BLADE

## (undated) DEVICE — SPONGE GZ W4XL4IN COT 12 PLY TYP VII WVN C FLD DSGN

## (undated) DEVICE — SUTURE ABSORBABLE MONOFILAMENT 4-0 SC-1 18 IN PLN GUT 1824H

## (undated) DEVICE — TOWEL,STOP FLAG GOLD N-W: Brand: MEDLINE

## (undated) DEVICE — SUTURE ABSORBABLE BRAIDED 2-0 CT-1 27 IN UD VICRYL J259H

## (undated) DEVICE — SUTURE STRATAFIX SPRL SZ 1 L14IN ABSRB VLT L48CM CTX 1/2 SXPD2B405

## (undated) DEVICE — Z DISCONTINUED GLOVE SURG SZ 7.5 L12IN FNGR THK13MIL WHT ISOLEX

## (undated) DEVICE — STRIP,CLOSURE,WOUND,MEDI-STRIP,1/2X4: Brand: MEDLINE

## (undated) DEVICE — TUBING, SUCTION, 1/4" X 12', STRAIGHT: Brand: MEDLINE

## (undated) DEVICE — SUTURE FIBERWIRE SZ 2 W/ TAPERED NEEDLE BLUE L38IN NONABSORB BLU L26.5MM 1/2 CIRCLE AR7200

## (undated) DEVICE — SUTURE VCRL SZ 1 L27IN ABSRB UD CT-1 L36MM 1/2 CIR J261H

## (undated) DEVICE — SOLUTION IV 1000ML 0.9% SOD CHL

## (undated) DEVICE — TOTAL TRAY, 16FR 10ML SIL FOLEY, URN: Brand: MEDLINE

## (undated) DEVICE — DRESSING FOAM W4XL12IN SIL RECT ADH WTRPRF FLM BK W/ BORD

## (undated) DEVICE — SPONGE,LAP,18"X18",DLX,XR,ST,5/PK,40/PK: Brand: MEDLINE

## (undated) DEVICE — APPLICATOR MEDICATED 26 CC SOLUTION HI LT ORNG CHLORAPREP

## (undated) DEVICE — TOWEL,OR,DSP,ST,BLUE,DLX,8/PK,10PK/CS: Brand: MEDLINE

## (undated) DEVICE — GOWN,SIRUS,POLYRNF,BRTHSLV,XL,30/CS: Brand: MEDLINE